# Patient Record
Sex: FEMALE | Race: BLACK OR AFRICAN AMERICAN | Employment: FULL TIME | URBAN - METROPOLITAN AREA
[De-identification: names, ages, dates, MRNs, and addresses within clinical notes are randomized per-mention and may not be internally consistent; named-entity substitution may affect disease eponyms.]

---

## 2017-10-05 ENCOUNTER — OFFICE VISIT (OUTPATIENT)
Dept: FAMILY MEDICINE CLINIC | Age: 37
End: 2017-10-05

## 2017-10-05 DIAGNOSIS — E66.9 OBESITY, UNSPECIFIED CLASSIFICATION, UNSPECIFIED OBESITY TYPE, UNSPECIFIED WHETHER SERIOUS COMORBIDITY PRESENT: Primary | ICD-10-CM

## 2017-10-05 NOTE — PROGRESS NOTES
Patient attended a Medically Supervised Weight Loss New Patient Orientation today where we discussed:  - New Direction Very Low Calorie Diet details  - Medical Supervision  - Nutrition education  - Cost  - Policies and compliance required for program enrollment. - Lab slip was given to patient with instructions for having them drawn. Patients initial consultation with physician is tentatively scheduled for:  Future Appointments  Date Time Provider Vadim Burnett   10/24/2017 11:15 AM Theoodra Saabsai, DO [facility] starting weight was documented as: There were no vitals taken for this visit. The following patient answers were pulled from Weight Loss Questionnaire regarding weight related illness: (refer to media section for full weight loss history)  Hypertension (high blood pressure)  no   High cholesterol no   Hypothyroidism no   Obstructive sleep apnea no   Gout no   Arthritis no ;    Heart Attack in the last 3 months: no     Type I Diabetes no   Type II Diabetes   no           [facility]  Metabolic   [address] [place] [address]  Medically Supervised [facility] Loss Program  [address] [place] [address]. [facility] Shana Cooper, [facility] [address]  [phone]  office  [phone]  Fax  [email or web address]  [email or web address]. [facility]. [facility]

## 2017-10-24 ENCOUNTER — OFFICE VISIT (OUTPATIENT)
Dept: FAMILY MEDICINE CLINIC | Age: 37
End: 2017-10-24

## 2017-10-24 VITALS
RESPIRATION RATE: 17 BRPM | DIASTOLIC BLOOD PRESSURE: 83 MMHG | HEIGHT: 64 IN | SYSTOLIC BLOOD PRESSURE: 128 MMHG | TEMPERATURE: 97.7 F | BODY MASS INDEX: 31.94 KG/M2 | WEIGHT: 187.1 LBS | HEART RATE: 93 BPM

## 2017-10-24 DIAGNOSIS — R73.03 PREDIABETES: ICD-10-CM

## 2017-10-24 DIAGNOSIS — E55.9 VITAMIN D DEFICIENCY: ICD-10-CM

## 2017-10-24 DIAGNOSIS — E78.5 ELEVATED LIPIDS: ICD-10-CM

## 2017-10-24 DIAGNOSIS — E66.9 OBESITY (BMI 30-39.9): Primary | ICD-10-CM

## 2017-10-24 NOTE — MR AVS SNAPSHOT
Visit Information Date & Time Provider Department Dept. Phone Encounter #  
 10/24/2017 11:15 AM Katharina Guadarrama, [address] [facility] ASSOCIATES [phone] [id number] Follow-up Instructions Return in about 6 weeks (around 12/5/2017) for MSWL & Lab Draw. Upcoming Health Maintenance Date Due DTaP/Tdap/Td series (1 - Tdap) 10/22/2003 INFLUENZA AGE 9 TO ADULT 8/1/2017 PAP AKA CERVICAL CYTOLOGY 3/9/2018 Allergies as of 10/24/2017  Review Complete On: 10/24/2017 By: Katharina Guadarrama, DO No Known Allergies Current Immunizations  Reviewed on 10/24/2017 Name Date Influenza Vaccine 12/17/2014  1:00 PM  
 Influenza Vaccine (Quad) 9/14/2015  9:50 AM  
  
 Reviewed by Maggie Barlow LPN on 70/29/9124 at 11:38 AM  
You Were Diagnosed With   
  
 Codes Comments Obesity (BMI 30-39.9)    -  Primary ICD-10-CM: S53.8 ICD-9-CM: 278.00 Vitals BP Pulse Temp Resp Height(growth percentile) Weight(growth percentile) 128/83 93 97.7 °F (36.5 °C) (Oral) 17 5' 4\" (1.626 m) 187 lb 1.6 oz (84.9 kg) LMP Breastfeeding? BMI OB Status Smoking Status 10/22/2017 Yes 32.12 kg/m2 Recent pregnancy Never Smoker Vitals History BMI and BSA Data Body Mass Index Body Surface Area  
 32.12 kg/m 2 1.96 m 2 Preferred Pharmacy Pharmacy Name Phone Mary [address] [place] [address] [place] Anand Carreno Your Updated Medication List  
  
   
This list is accurate as of: 10/24/17 12:07 PM.  Always use your most recent med list.  
  
  
  
  
 Chantelle Brenners 68 mg Impl Generic drug:  etonogestrel  
by SubDERmal route. We Performed the Following AMB POC EKG ROUTINE W/ 12 LEADS, INTER & REP [32909 CPT(R)] Follow-up Instructions Return in about 6 weeks (around 12/5/2017) for MSWL & Lab Draw. Patient Instructions Homework for [employer] 
 
 
 
Exercise Exercise daily  
- Start slow, gradually increase your time by around 10 to 20 % a week - To prevent injury, take a recovery week every 4 weeks (reduce your exercise time and intensity by 1/2 during this time) - Your goal is to work up to 150 min a week; hard enough that you can't whistle or sing. It may take 6 months to work up to this. - Call the Health  at [facility] labs for exercise ideas ([phone]) Common Side Effects (In order of how common) -Fatigue 
-Hunger 
-Constipation 
-Low sodium 
-Hair Loss 1. Fatigue Everyone goes through this stage It's normal 
It lasts 10 - 14 days It goes away It's your body adjusting to the Ketogenic diet and it's normal  
 
 
2. Hunger More common in the first few days After your body adjusts to the Ketogenic diet it will go away 3. Constipation  
-Drink at least 64 oz of non-calorie fluid a day 
-Add fiber (at least 20 grams a day) 1. Get the New Direction products with fiber added 2. Use SUGAR-FREE products: Fiber One products, Benefiber or Metamucil If you're prone to constipation: Take a Stool Softener every day. 
   (eg - Dulcolax Stool Softener 100 mg or Miralax) If you get constipated: 1. Start with a Stool Softener (produces a bowel movement in 72 hr): 
 Examples: 
    Miralax 1 capful twice a day (It's OK to go up to 3 caps for a few days) Dulcolax Stool Softener 100 mg 2. Next: If you still have constipation after 2 or 3 days, add a Stimulant Laxative (this will produce a bowel movement in 6 - 12 hr): 
 Examples: 
    Exlax (1 small square) for up to 4 days Dulcolax stimulant laxative (8.25 mg) Magnesium citrate pill 500 mg 3 - 4 pills a day 3. Or you can go straight to a combination Stool Softner / Stimulant at night. If  you need, you can add a morning dose. Examples: 
    Pericolace 50 mg / 8.25 mg     Sennakot-S  50 mg / 8.25 mg 
 
 4.  Finally If You're Really locked up, get Liquid Magnesium Citrate (take  according to the directions) 4. Low blood levels of sodium 
-Not very common, especially if you're not taking a diuretic (fluid pill) If you develop a headache, feel fatigued, light-headed or dizzy Drink 1/2 cup of tian broth up to twice a day until you feel normal 
 
 
5. Hair loss 
-This is fairly uncommon; you may see more than a usual amount of hair in your brush or the shower drain This can happen with any physical stress (surgery, trauma or calorie restriction) or psychological stress. Although is it very concerning, it is often temporary and will resolve within 3 months. Some people notice a benefit from taking a daily dose of Biotin 2,500 mcg and increasing their Fish Oil intake. Books to [address] [place] [address] 1. Change Anything: [employer] of Personal Success 
by Bruce Juarez, Michelle Salguero, and Ketan Paris 2. Mindless Eating 
by Anne Campuzano 3. Perfectly Yourself 
by Slime Roa 4. Me, Myself and I - 28 Days to Creative Self-Love 
by Stalin Do version only Note: Reading these books is a small but significant investment in yourself. Merely following the diet will reliably result in weight loss. However, revising how you respond to stressful situations, how you relate to food and, your commitment to self-care (adequate sleep, exercise & daily reflection) is the ONLY way to protect your weight loss and free yourself from your patterns that lead to weight gain. Compliance We do not expect perfection. However, we do ask for your persistence and your willingness to do the work of growing yourself. You will hit plateaus in your weight loss. You will run into situations that test your will power. Faustine Levels will encounter times when you feel frustrated. It's OK if you slip up from time to time.   However, how your respond to your slip ups and, the adjustments you make to prevent future slip ups will determine you long-term success. If you find yourself temporarily slipping in the program, it's OK. We will gently nudge you forward and encourage you to do the work of identifying and moving beyond your stuck areas. However, if you find yourself wavering in the program for a prolonged time (more than 3 or 4 months) we will ask that you take a break from the program.  At that time you will 3 options:  
 
1. Work with our weight loss specialist RAJI Kessler to explore additional weight loss options. 2. Work with a counselor to do some focused work in order to identify and break the patterns that are holding you back. 3.  The combination of both these. Once you, your counselor and/or Marlene feel that you have moved past those patterns and want to give the program another chance, we will gladly work with you to determine if you're ready to start back in the program. 
 
When returning after a break, if it's been less than 3 months, you do not need to repeat an orientation, labs or an EKG. If it's over been 3 months you will required to repeat an orientation and, if greater than 6 months, you will be required to repeat an orientation, labs and an EKG. Additional Tips 
 
- Consume your 4 daily meal replacements equally spaced over the    day No more than 6 hours between each meal 
 Breakfast is especially important - Get the support of family and friends 
 
- Snack-proof your house - Have strategies for social situations, meetings that run over or vacation - When you fall off the plan it's no big deal; just start right back. Turn those days into examples of what to change or avoid next time. Long-term Healthy Weight / Body Fat Different ways to determining your ideal weight: 
1. Keep your waist to less than 1/2 of your height 2. Keep your % body fat to under 30% for female and under 20% if male 3. Keep your BMI around 25 Supplements 1. Vitacost Synergy Basic Multi-Vitamin (Their In-House Brand) Take 1 capsule twice a day Found ONLY at [facility], NOT at [facility], [facility], the Vitamin Shoppe, etc 
    SKU #: [id number]  
    $16.49   / 1 month supply 
    www. [employer]  [phone]  
 
 
2. N-Acetyl Cysteine (NAC) -- 600 mg 
     1 pill once a day Found at many stores but [address] has a good price: 
     Item #: [id number] [id number]  $9.99 / 120 pills (4 month supply) 
     www. [employer]  [phone] 3. Turmeric with Black Pepper Extract (or Bioperine) 500 mg 
    1 pill 2 - 3 times a day (more is joint pain or increased inflammation) Found at many stores but [address] has a good price: 
    SKU #: [id number]  $13.64 / 61 pills 
    www. [employer]  [phone] 4. Probiotic: 15-35 (35 billion CFU) 1 capsule twice a day for 2 weeks, then 3 days a week SKU #: [id number]  $27.99 / 120 capsules 
     www. [employer]  [phone] 5. Fish Oil Take 1 capsule daily FYI:  
Typical fish oil per pill =  mg and  mg 
Krill oil per pill = EPA 50 - 70 mg and DHA 27 - 250 mg 
 
6. Other ones to help with will power -Dona [employer] Crab Apple - Helps you with self acceptance Agrimony - Helps you be authentic and not fall prey to other's expectations Gorse - Helps you stay strong when you feel like giving up Put 4 drops in 10 oz water or a meal replacement 2 - 4 times a day Around $15 a vial which lasts ~3 months 
       www. [employer]  [phone] 
 
 
    -Etherium Red Decision Power 1oz spray 16.95 Gives you more will power in the face of tough decisions 
        www.[employer]. [employer] or [phone] Drink Filtered Water My favorite water filter (also alkalinizes your water - much better than Mitzi) pH REFRESH Alkaline Water Pitcher Ionizer With 2 Long-Life Filters - Alkaline  Machine - Ionized Water Alkalizer Filter, 84oz, 2.5L (2017 Model) Mota [place]) Sold on [employer] w/ Free Shipping 
 
 
 
^^^^^^^^^^^^^^^^^^^^^^^^^^^^^^^^^^^^^^^^^^^^^^^^^^^^^^^^^^^^^^^^^^^^^^^^^^^^^^^^^^^^^^^^^^^^^^^ Carbohydrates If you do not metabolize carbohydrates well, you will lose weight and keep the weigh off by keeping your carbohydrate intake low. How do you know if you do not metabolize carbs well? If your Triglycerides, sdLCL-C and Insulin Resistance are elevated, then you will do well to follow a low carb diet. Fun Facts About Carbs - The Typical American Diet = 450 grams a day - The Reducing Phase of the VLCD = 40 grams a day - Target for weight loss maintenance: 
 - Eat no more than 30 grams per meal 
 - Eat no more than 10 grams per snack (mid-morning and mid-afternoon snack) Resources for finding out where carbs hide in our foods: 
1. Our Registered Dietitians 2. Www. [employer]. com/tools 3. Read food labels 4. Books - The Calorie Jihan Gomez - The New Atkins Diet for a New You 
     - Tameka Car's NEW Carb and Calorie 4th Edition (my favorite) 5. Smart phone and Internet-based apps - [employer]Pal  
     - Carb Manager If you want to get a better picture of your cardiac risk, consider getting a coronary calcium score:  Call [phone] to schedule one. Introducing [facility]! [employer] introduces Cost Effective Data patient portal. Now you can access parts of your medical record, email your doctor's office, and request medication refills online. 1. In your internet browser, go to [email or web address] 2. Click on the First Time User? Click Here link in the Sign In box. You will see the New Member Sign Up page. 3. Enter your Omicia Access Code exactly as it appears below. You will not need to use this code after youve completed the sign-up process. If you do not sign up before the expiration date, you must request a new code. · Omicia Access Code: [email or web address] Expires: 1/22/2018 12:07 PM 
 
4. Enter the last four digits of your Social Security Number (xxxx) and Date of Birth (mm/dd/yyyy) as indicated and click Submit. You will be taken to the next sign-up page. 5. Create a Omicia ID. This will be your Omicia login ID and cannot be changed, so think of one that is secure and easy to remember. 6. Create a Omicia password. You can change your password at any time. 7. Enter your Password Reset Question and Answer. This can be used at a later time if you forget your password. 8. Enter your e-mail address. You will receive e-mail notification when new information is available in [address]. 9. Click Sign Up. You can now view and download portions of your medical record. 10. Click the Download Summary menu link to download a portable copy of your medical information. If you have questions, please visit the Frequently Asked Questions section of the Omicia website. Remember, Omicia is NOT to be used for urgent needs. For medical emergencies, dial 911. Now available from your iPhone and Android! Please provide this summary of care documentation to your next provider. Your primary care clinician is listed as LINDSEY Tuttle. If you have any questions after today's visit, please call [phone].

## 2017-10-24 NOTE — PATIENT INSTRUCTIONS
Homework for [employer]        Exercise    Exercise daily   - Start slow, gradually increase your time by around 10 to 20 % a week    - To prevent injury, take a recovery week every 4 weeks (reduce your exercise time and intensity by 1/2 during this time)    - Your goal is to work up to 150 min a week; hard enough that you can't whistle or sing. It may take 6 months to work up to this. - Call the Health  at [facility] labs for exercise ideas ([phone])          Common Side Effects   (In order of how common)    -Fatigue  -Hunger  -Constipation  -Low sodium  -Hair Loss      1. Fatigue  Everyone goes through this stage  It's normal  It lasts 10 - 14 days  It goes away  It's your body adjusting to the Ketogenic diet and it's normal       2. Hunger  More common in the first few days  After your body adjusts to the Ketogenic diet it will go away       3. Constipation   -Drink at least 64 oz of non-calorie fluid a day  -Add fiber (at least 20 grams a day)     1. Get the New Direction products with fiber added     2. Use SUGAR-FREE products: Fiber One products, Benefiber or Metamucil    If you're prone to constipation: Take a Stool Softener every day.     (eg - Dulcolax Stool Softener 100 mg or Miralax)    If you get constipated:     1. Start with a Stool Softener (produces a bowel movement in 72 hr):   Examples:      Miralax 1 capful twice a day (It's OK to go up to 3 caps for a few days)      Dulcolax Stool Softener 100 mg       2. Next: If you still have constipation after 2 or 3 days, add a Stimulant          Laxative (this will produce a bowel movement in 6 - 12 hr):   Examples:      Exlax (1 small square) for up to 4 days      Dulcolax stimulant laxative (8.25 mg)       Magnesium citrate pill 500 mg 3 - 4 pills a day       3. Or you can go straight to a combination Stool Softner / Stimulant at night. If  you need, you can add a morning dose.    Examples:      Pericolace 50 mg / 8.25 mg      Sennakot-S  50 mg / 8.25 mg       4. Finally If You're Really locked up, get Liquid Magnesium Citrate (take  according to the directions)      4. Low blood levels of sodium  -Not very common, especially if you're not taking a diuretic (fluid pill)  If you develop a headache, feel fatigued, light-headed or dizzy    Drink 1/2 cup of bouillon broth up to twice a day until you feel normal      5. Hair loss  -This is fairly uncommon; you may see more than a usual amount of hair in your brush or the shower drain  This can happen with any physical stress (surgery, trauma or calorie restriction) or psychological stress. Although is it very concerning, it is often temporary and will resolve within 3 months. Some people notice a benefit from taking a daily dose of Biotin 2,500 mcg and increasing their Fish Oil intake. Books to [address] [place] [address]    1. Change Anything: [employer] of Personal Success  by Chemo Chávez, Ochoa Coker, and Fredrick Farah    2. Mindless Eating  by Carolina Perez    3. Perfectly Yourself  by Katalina King    4. Me, Myself and I - 28 Days to Creative Self-Love  by Mag Hernandez version only    Note: Reading these books is a small but significant investment in yourself. Merely following the diet will reliably result in weight loss. However, revising how you respond to stressful situations, how you relate to food and, your commitment to self-care (adequate sleep, exercise & daily reflection) is the ONLY way to protect your weight loss and free yourself from your patterns that lead to weight gain. Compliance    We do not expect perfection. However, we do ask for your persistence and your willingness to do the work of growing yourself. You will hit plateaus in your weight loss. You will run into situations that test your will power. Raudel Cantu will encounter times when you feel frustrated. It's OK if you slip up from time to time.   However, how your respond to your slip ups and, the adjustments you make to prevent future slip ups will determine you long-term success. If you find yourself temporarily slipping in the program, it's OK. We will gently nudge you forward and encourage you to do the work of identifying and moving beyond your stuck areas. However, if you find yourself wavering in the program for a prolonged time (more than 3 or 4 months) we will ask that you take a break from the program.  At that time you will 3 options:     1. Work with our weight loss specialist RAIJ Kessler to explore additional weight loss options. 2. Work with a counselor to do some focused work in order to identify and break the patterns that are holding you back. 3.  The combination of both these. Once you, your counselor and/or Marlene feel that you have moved past those patterns and want to give the program another chance, we will gladly work with you to determine if you're ready to start back in the program.    When returning after a break, if it's been less than 3 months, you do not need to repeat an orientation, labs or an EKG. If it's over been 3 months you will required to repeat an orientation and, if greater than 6 months, you will be required to repeat an orientation, labs and an EKG. Additional Tips    - Consume your 4 daily meal replacements equally spaced over the    day   No more than 6 hours between each meal   Breakfast is especially important    - Get the support of family and friends    - Snack-proof your house    - Have strategies for social situations, meetings that run over or vacation      - When you fall off the plan it's no big deal; just start right back. Turn those days into examples of what to change or avoid next time. Long-term Healthy Weight / Body Fat  Different ways to determining your ideal weight:  1. Keep your waist to less than 1/2 of your height   2.  Keep your % body fat to under 30% for female and under 20% if male  3. Keep your BMI around 25          Supplements      1. Vitacost Synergy Basic Multi-Vitamin (Their In-House Brand)      Take 1 capsule twice a day        Found ONLY at [facility], NOT at [facility], [place], [facility], the Vitamin Shoppe, etc      SKU #: [id number]       $16.49   / 1 month supply      www. [employer]  [phone]       2. N-Acetyl Cysteine (NAC) -- 600 mg       1 pill once a day       Found at many stores but [employer] [place] has a good price:       Item #: [id number]  $9.99 / 120 pills (4 month supply)       www. [employer]  [phone]      3. Turmeric with Black Pepper Extract (or Bioperine) 500 mg      1 pill 2 - 3 times a day (more is joint pain or increased inflammation)      Found at many stores but [employer] has a good price:      SKU #: [id number]  $13.64 / 60 pills      www. [employer]  [phone]       4. Probiotic: 15-35 (35 billion CFU)         1 capsule twice a day for 2 weeks, then 3 days a week       SKU #: [id number]  $27.99 / 120 capsules       www. [employer]  [phone]       5. Fish Oil      Take 1 capsule daily                             FYI:   Typical fish oil per pill =  mg and  mg  Krill oil per pill = EPA 50 - 70 mg and DHA 27 - 250 mg    6. Other ones to help with will power      -[address] Edie you with self acceptance         Agrimony - Helps you be authentic and not fall prey to other's expectations         Gorse - Helps you stay strong when you feel like giving up           Put 4 drops in 10 oz water or a meal replacement 2 - 4 times a day         Around $15 a vial which lasts ~3 months         www. [employer]  [phone]          -Etherium Red Decision Power 1oz spray 16.95          Gives you more will power in the face of tough decisions          www.[employer]. com or (50 [phone]                Drink Filtered Water    My favorite water filter (also alkalinizes your water - much better than Mitzi)    pH REFRESH Alkaline Water Pitcher Ionizer With 2 Long-Life Filters - Alkaline  Machine - Ionized Water Alkalizer Filter, 84oz, 2.5L (2017 Model) (White)   Sold on [employer] w/ Free Shipping        ^^^^^^^^^^^^^^^^^^^^^^^^^^^^^^^^^^^^^^^^^^^^^^^^^^^^^^^^^^^^^^^^^^^^^^^^^^^^^^^^^^^^^^^^^^^^^^^      Carbohydrates     If you do not metabolize carbohydrates well, you will lose weight and keep the weigh off by keeping your carbohydrate intake low. How do you know if you do not metabolize carbs well? If your Triglycerides, sdLCL-C and Insulin Resistance are elevated, then you will do well to follow a low carb diet. Fun Facts About Carbs    - The Typical American Diet = 450 grams a day    - The Reducing Phase of the VLCD = 40 grams a day    - Target for weight loss maintenance:   - Eat no more than 30 grams per meal   - Eat no more than 10 grams per snack (mid-morning and mid-afternoon snack)        Resources for finding out where carbs hide in our foods:  1. Our Registered Dietitians    2. Www. Atkins. com/tools    3. Read food labels    4. Books       - The Calorie Nikky Mich       - The [place] System Diet for a New You       - Tameka Car's NEW Carb and Calorie 4th Edition (my favorite)    5. Smart phone and Internet-based apps       - [employer] FunPal        - Carb Manager      If you want to get a better picture of your cardiac risk, consider getting a coronary calcium score:  Call [phone] to schedule one.

## 2017-10-24 NOTE — PROGRESS NOTES
[facility] Progress Note: Initial Physician Visit     Shira Vieira is a 30 y.o. female who is here for medical screening for entering the [facility].     CC:  Obesity      Weight History  I personally reviewed the Medical Screening Roel Edgar completed by patient and scanned into media section of chart. It includes duration of their obesity, maximum weight, goal weight and weight gain time line (timing), all of which give the context of their obesity AND a Family History of their obesity. Is their Weight Loss Goal entered in to Comments? Yes 135lb    Weight loss History  I personally reviewed the Medical Screening Roel Edgar completed by the patient and scanned into media section of chart. It includes number of weight loss attempts, the weight loss program that patients was most successful using, and if they have any hx of anorectic medication use, including OTC supplements. This captures modifying factors. Significant Medical History  History reviewed. No pertinent past medical history. I personally reviewed the Medical Screening Roel Edgar completed by the patient and scanned into media section of chart. This allows me to assess associated symptoms that are significant in the assessment of the patient's obesity and the patient's Past Medical History. Outpatient Prescriptions Marked as Taking for the 10/24/17 encounter (Office Visit) with Ana Maria Schuler, DO   Medication Sig Dispense Refill    etonogestrel (NEXPLANON) 68 mg impl by SubDERmal route.            Significant Psychosocial History   Has a doctor every diagnosed with Binge Eating Disorder, Bulemia or Anorexia? : no     Compliance  Upcoming Travel? yes    Social History  Social History   Substance Use Topics    Smoking status: Never Smoker    Smokeless tobacco: Never Used    Alcohol use No       Exercise  I personally reviewed the Medical Screening Roel Edgar completed by the patient and scanned into media section of chart. Review of Systems  See HPI        Objective  Visit Vitals    /83    Pulse 93    Temp 97.7 °F (36.5 °C) (Oral)    Resp 17    Ht 5' 4\" (1.626 m)    Wt 187 lb 1.6 oz (84.9 kg)    LMP 10/22/2017    Breastfeeding Yes    BMI 32.12 kg/m2         Weight Metrics 10/24/2017 10/24/2017 6/24/2016 2/26/2016 9/14/2015 6/3/2015 5/22/2015   Weight - 187 lb 1.6 oz 191 lb 6.4 oz 180 lb 177 lb 174 lb 177 lb 14.4 oz   Waist Measure Inches 31 - - - - - -   Body Fat % 32 - - - - - -   BMI - 32.12 kg/m2 32.84 kg/m2 30.88 kg/m2 30.37 kg/m2 29.85 kg/m2 30.52 kg/m2       Labs: See HDL labs scanned into Media section       Physical Exam    Vital Signs Reviewed  Weight Management Metrics Reviewed    Appearance: Obese  HEENT:  Scleral icterus?  no  Neck:  Thyromegaly or nodules? no  Mouth: Large tongue no  Heart:  RRR  Lungs:  LCTA-B  Abdomen:     Hepatomegaly? no   Striae present? no  Skin:    Acne?  no   Hirsutism? no   Skin tags? no   Acanthosis Nigricans?  no  Ext:  Edema? no      Assessment & Plan  Encounter Diagnoses   Name Primary?  Obesity (BMI 30-39. 9) Yes    Vitamin D deficiency     Prediabetes     Elevated lipids        1. Labs reviewed w/ patient    2. EKG reviewed w/ patient:   Personally reviewed by me, NSR, No abnormalities,    QTc = 398 sec (Upper limit is 440 for males & 460 for females)    3. Medication changes include: None    4. Based on his history, labs and EKG, Angela Quinones is now enrolled for the [facility].     5.  Individualized Patient Plan is as follows:   Diet Regimen:  Meal Replacements daily. Weigh in: weekly at [facility]   BP f/up plan: weekly at [facility]       25 min of the 45 minutes face to face time with Desi consisted of counseling & coordinating and/or discussing treatment plans in reference to her above mentioned diagnoses.

## 2017-10-31 ENCOUNTER — CLINICAL SUPPORT (OUTPATIENT)
Dept: FAMILY MEDICINE CLINIC | Age: 37
End: 2017-10-31

## 2017-10-31 VITALS
HEIGHT: 64 IN | TEMPERATURE: 97.6 F | WEIGHT: 183.8 LBS | SYSTOLIC BLOOD PRESSURE: 110 MMHG | RESPIRATION RATE: 18 BRPM | BODY MASS INDEX: 31.38 KG/M2 | HEART RATE: 84 BPM | DIASTOLIC BLOOD PRESSURE: 65 MMHG

## 2017-10-31 DIAGNOSIS — E66.9 OBESITY (BMI 30.0-34.9): Primary | ICD-10-CM

## 2017-10-31 NOTE — PROGRESS NOTES
Progress Note: Weekly Education Class in the [facility]   Is there anything that you or the patient needs to let Dr Annita Larry know about? no  Over the past week, have you experienced any side-effects? Dry mouth    Kate Orantes is a 30 y.o. female who is enrolled in [facility]    Visit Vitals    Ht 5' 4\" (1.626 m)    LMP 10/22/2017     Weight Metrics 10/24/2017 10/24/2017 6/24/2016 2/26/2016 9/14/2015 6/3/2015 5/22/2015   Weight - 187 lb 1.6 oz 191 lb 6.4 oz 180 lb 177 lb 174 lb 177 lb 14.4 oz   Waist Measure Inches 31 - - - - - -   Body Fat % 32 - - - - - -   BMI - 32.12 kg/m2 32.84 kg/m2 30.88 kg/m2 30.37 kg/m2 29.85 kg/m2 30.52 kg/m2         Have you received any other medical care this week? no  If yes, where and for what? Have you had any change in your medications since your last visit? no  If yes what? Did you have any problems adhering to the program last week? no  If yes, please explain:       Eating Habits Over Last Week:  Did you take in 64 oz of non-caloric fluids? yes     Did you consume your 4 meal replacements each day?  yes       Physical Activity Over the Past Week:    Aerobic exercise: 90 min  Resistance exercise: 0 workouts / week

## 2017-11-07 ENCOUNTER — CLINICAL SUPPORT (OUTPATIENT)
Dept: FAMILY MEDICINE CLINIC | Age: 37
End: 2017-11-07

## 2017-11-07 DIAGNOSIS — E66.9 OBESITY (BMI 30.0-34.9): Primary | ICD-10-CM

## 2017-11-08 VITALS
DIASTOLIC BLOOD PRESSURE: 81 MMHG | BODY MASS INDEX: 30.48 KG/M2 | WEIGHT: 178.5 LBS | SYSTOLIC BLOOD PRESSURE: 125 MMHG | HEART RATE: 93 BPM | HEIGHT: 64 IN

## 2017-11-08 NOTE — PROGRESS NOTES
Progress Note: Weekly Education Class in the [facility]   Is there anything that you or the patient needs to let Dr Meeta Joiner know about? no  Over the past week, have you experienced any side-effects? no    Evelyne Day is a 30 y.o. female who is enrolled in [facility]    Visit Vitals    /81    Pulse 93    Ht 5' 4\" (1.626 m)    Wt 178 lb 8 oz (81 kg)    LMP 10/22/2017    BMI 30.64 kg/m2     Weight Metrics 11/8/2017 11/7/2017 10/31/2017 10/31/2017 10/24/2017 10/24/2017 6/24/2016   Weight - 178 lb 8 oz - 183 lb 12.8 oz - 187 lb 1.6 oz 191 lb 6.4 oz   Waist Measure Inches 31 - - - 31 - -   Body Fat % 30.5 - 35.6 - 32 - -   BMI - 30.64 kg/m2 - 31.55 kg/m2 - 32.12 kg/m2 32.84 kg/m2         Have you received any other medical care this week? no  If yes, where and for what? Have you had any change in your medications since your last visit? no  If yes what? Did you have any problems adhering to the program last week? no  If yes, please explain:       Eating Habits Over Last Week:  Did you take in 64 oz of non-caloric fluids? yes     Did you consume your 4 meal replacements each day?  yes       Physical Activity Over the Past Week:    Aerobic exercise: 90 min  Resistance exercise: 0 workouts / week

## 2017-11-14 ENCOUNTER — CLINICAL SUPPORT (OUTPATIENT)
Dept: FAMILY MEDICINE CLINIC | Age: 37
End: 2017-11-14

## 2017-11-14 VITALS
DIASTOLIC BLOOD PRESSURE: 79 MMHG | HEART RATE: 91 BPM | WEIGHT: 175.5 LBS | SYSTOLIC BLOOD PRESSURE: 116 MMHG | HEIGHT: 64 IN | BODY MASS INDEX: 29.96 KG/M2

## 2017-11-14 DIAGNOSIS — E66.9 OBESITY (BMI 30.0-34.9): Primary | ICD-10-CM

## 2017-11-14 NOTE — PROGRESS NOTES
Progress Note: Weekly Education Class in the [facility]   Is there anything that you or the patient needs to let Dr Livier Koch know about? no  Over the past week, have you experienced any side-effects? no    Jony Luna is a 30 y.o. female who is enrolled in [facility]    Visit Vitals    /79    Pulse 91    Ht 5' 4\" (1.626 m)    Wt 175 lb 8 oz (79.6 kg)    LMP 10/22/2017    BMI 30.12 kg/m2     Weight Metrics 11/14/2017 11/8/2017 11/7/2017 10/31/2017 10/31/2017 10/24/2017 10/24/2017   Weight 175 lb 8 oz - 178 lb 8 oz - 183 lb 12.8 oz - 187 lb 1.6 oz   Waist Measure Inches 30 31 - - - 31 -   Body Fat % 29 30.5 - 35.6 - 32 -   BMI 30.12 kg/m2 - 30.64 kg/m2 - 31.55 kg/m2 - 32.12 kg/m2         Have you received any other medical care this week? no  If yes, where and for what? Have you had any change in your medications since your last visit? no  If yes what? Did you have any problems adhering to the program last week? no  If yes, please explain:       Eating Habits Over Last Week:  Did you take in 64 oz of non-caloric fluids? yes     Did you consume your 4 meal replacements each day?  yes       Physical Activity Over the Past Week:    Aerobic exercise: 90 min  Resistance exercise: 0 workouts / week

## 2017-11-21 ENCOUNTER — CLINICAL SUPPORT (OUTPATIENT)
Dept: FAMILY MEDICINE CLINIC | Age: 37
End: 2017-11-21

## 2017-11-21 VITALS
BODY MASS INDEX: 29.67 KG/M2 | WEIGHT: 173.8 LBS | DIASTOLIC BLOOD PRESSURE: 78 MMHG | HEIGHT: 64 IN | SYSTOLIC BLOOD PRESSURE: 121 MMHG | HEART RATE: 63 BPM

## 2017-11-21 DIAGNOSIS — E66.9 OBESITY (BMI 30.0-34.9): Primary | ICD-10-CM

## 2017-11-21 NOTE — PROGRESS NOTES
Progress Note: Weekly Education Class in the [facility]   Is there anything that you or the patient needs to let Dr Vearl Boxer know about? yes  Over the past week, have you experienced any side-effects? no    Teresa Snowden is a 30 y.o. female who is enrolled in [facility]    Visit Vitals    /78    Pulse 63    Ht 5' 4\" (1.626 m)    Wt 173 lb 12.8 oz (78.8 kg)    LMP 10/22/2017    BMI 29.83 kg/m2     Weight Metrics 11/21/2017 11/14/2017 11/8/2017 11/7/2017 10/31/2017 10/31/2017 10/24/2017   Weight 173 lb 12.8 oz 175 lb 8 oz - 178 lb 8 oz - 183 lb 12.8 oz -   Waist Measure Inches 30 30 31 - - - 31   Body Fat % 28 29 30.5 - 35.6 - 32   BMI 29.83 kg/m2 30.12 kg/m2 - 30.64 kg/m2 - 31.55 kg/m2 -         Have you received any other medical care this week? no  If yes, where and for what? Have you had any change in your medications since your last visit? no  If yes what? Did you have any problems adhering to the program last week? no  If yes, please explain:       Eating Habits Over Last Week:  Did you take in 64 oz of non-caloric fluids? no     Did you consume your 4 meal replacements each day?  yes       Physical Activity Over the Past Week:    Aerobic exercise: 90 min  Resistance exercise: 0 workouts / week

## 2017-11-28 ENCOUNTER — CLINICAL SUPPORT (OUTPATIENT)
Dept: FAMILY MEDICINE CLINIC | Age: 37
End: 2017-11-28

## 2017-11-28 VITALS
DIASTOLIC BLOOD PRESSURE: 82 MMHG | SYSTOLIC BLOOD PRESSURE: 112 MMHG | HEIGHT: 64 IN | WEIGHT: 170.6 LBS | BODY MASS INDEX: 29.12 KG/M2 | HEART RATE: 67 BPM

## 2017-11-28 DIAGNOSIS — E66.9 OBESITY (BMI 30.0-34.9): Primary | ICD-10-CM

## 2017-11-28 NOTE — PROGRESS NOTES
Progress Note: Weekly Education Class in the [facility] Program   Is there anything that you or the patient needs to let Dr Carla Amaral know about? no  Over the past week, have you experienced any side-effects? no    Paty Quispe is a 30 y.o. female who is enrolled in [facility]    Visit Vitals    /82    Pulse 67    Ht 5' 4\" (1.626 m)    Wt 170 lb 9.6 oz (77.4 kg)    BMI 29.28 kg/m2     Weight Metrics 11/28/2017 11/21/2017 11/14/2017 11/8/2017 11/7/2017 10/31/2017 10/31/2017   Weight 170 lb 9.6 oz 173 lb 12.8 oz 175 lb 8 oz - 178 lb 8 oz - 183 lb 12.8 oz   Waist Measure Inches 29 30 30 31 - - -   Body Fat % 29 28 29 30.5 - 35.6 -   BMI 29.28 kg/m2 29.83 kg/m2 30.12 kg/m2 - 30.64 kg/m2 - 31.55 kg/m2         Have you received any other medical care this week? no  If yes, where and for what? Have you had any change in your medications since your last visit? no  If yes what? Did you have any problems adhering to the program last week? no  If yes, please explain:       Eating Habits Over Last Week:  Did you take in 64 oz of non-caloric fluids? yes     Did you consume your 4 meal replacements each day?  yes       Physical Activity Over the Past Week:    Aerobic exercise: 90 min  Resistance exercise: 0 workouts / week

## 2017-12-05 ENCOUNTER — OFFICE VISIT (OUTPATIENT)
Dept: FAMILY MEDICINE CLINIC | Age: 37
End: 2017-12-05

## 2017-12-05 VITALS
SYSTOLIC BLOOD PRESSURE: 125 MMHG | HEIGHT: 64 IN | HEART RATE: 71 BPM | RESPIRATION RATE: 18 BRPM | BODY MASS INDEX: 28.17 KG/M2 | DIASTOLIC BLOOD PRESSURE: 78 MMHG | TEMPERATURE: 97.6 F | WEIGHT: 165 LBS

## 2017-12-05 DIAGNOSIS — E66.3 OVERWEIGHT (BMI 25.0-29.9): ICD-10-CM

## 2017-12-05 DIAGNOSIS — Z86.39 HX OF OBESITY: ICD-10-CM

## 2017-12-05 DIAGNOSIS — R73.03 PREDIABETES: ICD-10-CM

## 2017-12-05 DIAGNOSIS — E78.5 ELEVATED LIPIDS: ICD-10-CM

## 2017-12-05 DIAGNOSIS — E55.9 VITAMIN D DEFICIENCY: ICD-10-CM

## 2017-12-05 DIAGNOSIS — E66.3 OVERWEIGHT (BMI 25.0-29.9): Primary | ICD-10-CM

## 2017-12-05 PROBLEM — E66.9 OBESITY (BMI 30-39.9): Status: RESOLVED | Noted: 2017-10-24 | Resolved: 2017-12-05

## 2017-12-05 NOTE — PATIENT INSTRUCTIONS
Body Mass Index: Care Instructions  Your Care Instructions    Body mass index (BMI) can help you see if your weight is raising your risk for health problems. It uses a formula to compare how much you weigh with how tall you are. · A BMI lower than 18.5 is considered underweight. · A BMI between 18.5 and 24.9 is considered healthy. · A BMI between 25 and 29.9 is considered overweight. A BMI of 30 or higher is considered obese. If your BMI is in the normal range, it means that you have a lower risk for weight-related health problems. If your BMI is in the overweight or obese range, you may be at increased risk for weight-related health problems, such as high blood pressure, heart disease, stroke, arthritis or joint pain, and diabetes. If your BMI is in the underweight range, you may be at increased risk for health problems such as fatigue, lower protection (immunity) against illness, muscle loss, bone loss, hair loss, and hormone problems. BMI is just one measure of your risk for weight-related health problems. You may be at higher risk for health problems if you are not active, you eat an unhealthy diet, or you drink too much alcohol or use tobacco products. Follow-up care is a key part of your treatment and safety. Be sure to make and go to all appointments, and call your doctor if you are having problems. It's also a good idea to know your test results and keep a list of the medicines you take. How can you care for yourself at home? · Practice healthy eating habits. This includes eating plenty of fruits, vegetables, whole grains, lean protein, and low-fat dairy. · If your doctor recommends it, get more exercise. Walking is a good choice. Bit by bit, increase the amount you walk every day. Try for at least 30 minutes on most days of the week. · Do not smoke. Smoking can increase your risk for health problems. If you need help quitting, talk to your doctor about stop-smoking programs and medicines. These can increase your chances of quitting for good. · Limit alcohol to 2 drinks a day for men and 1 drink a day for women. Too much alcohol can cause health problems. If you have a BMI higher than 25  · Your doctor may do other tests to check your risk for weight-related health problems. This may include measuring the distance around your waist. A waist measurement of more than 40 inches in men or 35 inches in women can increase the risk of weight-related health problems. · Talk with your doctor about steps you can take to stay healthy or improve your health. You may need to make lifestyle changes to lose weight and stay healthy, such as changing your diet and getting regular exercise. If you have a BMI lower than 18.5  · Your doctor may do other tests to check your risk for health problems. · Talk with your doctor about steps you can take to stay healthy or improve your health. You may need to make lifestyle changes to gain or maintain weight and stay healthy, such as getting more healthy foods in your diet and doing exercises to build muscle. Where can you learn more? Go to http://rosaels-landy.info/. Enter S176 in the search box to learn more about \"Body Mass Index: Care Instructions. \"  Current as of: October 13, 2016  Content Version: 11.4  © 6824-5592 [employer]. Care instructions adapted under license by [employer] (which disclaims liability or warranty for this information). If you have questions about a medical condition or this instruction, always ask your healthcare professional. [address] any warranty or liability for your use of this information.

## 2017-12-05 NOTE — MR AVS SNAPSHOT
Visit Information Date & Time Provider Department Dept. Phone Encounter #  
 12/5/2017 11:30 AM Alden IyerCaelb [id number] Follow-up Instructions Return in about 6 weeks (around 1/16/2018) for MS[facility] & Lab Draw. Your Appointments 12/12/2017  9:55 PM  
METABOLIC PROGRAM 5 with HRMP WEEKLY CLASS AMA  
HR Metabolic Program ([facility] [address]) Appt Note: wt check and bp check HR Metabolic Program ([address] [place], [place] [address]) [phone]  
  
    
 12/19/2017  5:06 PM  
METABOLIC PROGRAM 5 with HRMP WEEKLY CLASS AMA  
HR Metabolic Program ([facility] [address]) Appt Note: wt check and bp check HR Metabolic Program ([facility] [address]) [phone]  
  
    
 12/26/2017  5:14 PM  
METABOLIC PROGRAM 5 with HRMP WEEKLY CLASS AMA  
HR Metabolic Program ([facility] [address]) Appt Note: wt check and bp check HR Metabolic Program ([facility] [employer]) [phone] Upcoming Health Maintenance Date Due DTaP/Tdap/Td series (1 - Tdap) 10/22/2003 Influenza Age 5 to Adult 8/1/2017 PAP AKA CERVICAL CYTOLOGY 3/9/2018 Allergies as of 12/5/2017  Review Complete On: 12/5/2017 By: Alden Iyer DO No Known Allergies Current Immunizations  Reviewed on 12/5/2017 Name Date Influenza Vaccine 12/17/2014  1:00 PM  
 Influenza Vaccine (Quad) 9/14/2015  9:50 AM  
  
 Reviewed by Stephy Coyle LPN on 76/9/0662 at 94:11 AM  
You Were Diagnosed With   
  
 Codes Comments Overweight (BMI 25.0-29.9)    -  Primary ICD-10-CM: E77.9 ICD-9-CM: 278.02 Hx of obesity     ICD-10-CM: Z86.39 
ICD-9-CM: V13.89 Elevated lipids     ICD-10-CM: E78.5 ICD-9-CM: 272.4 Prediabetes     ICD-10-CM: R73.03 
ICD-9-CM: 790.29 Vitamin D deficiency     ICD-10-CM: E55.9 ICD-9-CM: 268.9 Vitals BP Pulse Temp Resp Height(growth percentile) Weight(growth percentile) 125/78 71 97.6 °F (36.4 °C) (Oral) 18 5' 4\" (1.626 m) 165 lb (74.8 kg) LMP BMI OB Status Smoking Status 11/30/2017 28.32 kg/m2 Having regular periods Never Smoker Vitals History BMI and BSA Data Body Mass Index Body Surface Area  
 28.32 kg/m 2 1.84 m 2 Preferred Pharmacy Pharmacy Name Phone [facility] [address] [place] [address] Tatyana Carreno Your Updated Medication List  
  
   
This list is accurate as of: 12/5/17 11:55 AM.  Always use your most recent med list.  
  
  
  
  
 Himanshu Brittle 68 mg Impl Generic drug:  etonogestrel  
by SubDERmal route. Follow-up Instructions Return in about 6 weeks (around 1/16/2018) for MS[facility] & Lab Draw. To-Do List   
 12/05/2017 Lab:  METABOLIC PANEL, COMPREHENSIVE   
  
 12/05/2017 Lab:  URIC ACID Patient Instructions Body Mass Index: Care Instructions Your Care Instructions Body mass index (BMI) can help you see if your weight is raising your risk for health problems. It uses a formula to compare how much you weigh with how tall you are. · A BMI lower than 18.5 is considered underweight. · A BMI between 18.5 and 24.9 is considered healthy. · A BMI between 25 and 29.9 is considered overweight. A BMI of 30 or higher is considered obese. If your BMI is in the normal range, it means that you have a lower risk for weight-related health problems. If your BMI is in the overweight or obese range, you may be at increased risk for weight-related health problems, such as high blood pressure, heart disease, stroke, arthritis or joint pain, and diabetes. If your BMI is in the underweight range, you may be at increased risk for health problems such as fatigue, lower protection (immunity) against illness, muscle loss, bone loss, hair loss, and hormone problems. BMI is just one measure of your risk for weight-related health problems. You may be at higher risk for health problems if you are not active, you eat an unhealthy diet, or you drink too much alcohol or use tobacco products. Follow-up care is a key part of your treatment and safety. Be sure to make and go to all appointments, and call your doctor if you are having problems. It's also a good idea to know your test results and keep a list of the medicines you take. How can you care for yourself at home? · Practice healthy eating habits. This includes eating plenty of fruits, vegetables, whole grains, lean protein, and low-fat dairy. · If your doctor recommends it, get more exercise. Walking is a good choice. Bit by bit, increase the amount you walk every day. Try for at least 30 minutes on most days of the week. · Do not smoke. Smoking can increase your risk for health problems. If you need help quitting, talk to your doctor about stop-smoking programs and medicines. These can increase your chances of quitting for good. · Limit alcohol to 2 drinks a day for men and 1 drink a day for women. Too much alcohol can cause health problems. If you have a BMI higher than 25 · Your doctor may do other tests to check your risk for weight-related health problems. This may include measuring the distance around your waist. A waist measurement of more than 40 inches in men or 35 inches in women can increase the risk of weight-related health problems. · Talk with your doctor about steps you can take to stay healthy or improve your health. You may need to make lifestyle changes to lose weight and stay healthy, such as changing your diet and getting regular exercise. If you have a BMI lower than 18.5 · Your doctor may do other tests to check your risk for health problems. · Talk with your doctor about steps you can take to stay healthy or improve your health.  You may need to make lifestyle changes to gain or maintain weight and stay healthy, such as getting more healthy foods in your diet and doing exercises to build muscle. Where can you learn more? Go to http://rosales-landy.info/. Enter S176 in the search box to learn more about \"Body Mass Index: Care Instructions. \" Current as of: October 13, 2016 Content Version: 11.4 © 2891-2864 [employer]. Care instructions adapted under license by [employer] (which disclaims liability or warranty for this information). If you have questions about a medical condition or this instruction, always ask your healthcare professional. [address] any warranty or liability for your use of this information. Introducing [facility]! Vishal Abraham introduces [employer] patient portal. Now you can access parts of your medical record, email your doctor's office, and request medication refills online. 1. In your internet browser, go to [email or web address] 2. Click on the First Time User? Click Here link in the Sign In box. You will see the New Member Sign Up page. 3. Enter your [employer] Access Code exactly as it appears below. You will not need to use this code after youve completed the sign-up process. If you do not sign up before the expiration date, you must request a new code. · [employer] Access Code: [email or web address] Expires: 1/22/2018 11:07 AM 
 
4. Enter the last four digits of your Social Security Number (xxxx) and Date of Birth (mm/dd/yyyy) as indicated and click Submit. You will be taken to the next sign-up page. 5. Create a [employer] ID. This will be your [employer] login ID and cannot be changed, so think of one that is secure and easy to remember. 6. Create a [employer] password. You can change your password at any time. 7. Enter your Password Reset Question and Answer. This can be used at a later time if you forget your password. 8. Enter your e-mail address. You will receive e-mail notification when new information is available in [address] 9. Click Sign Up. You can now view and download portions of your medical record. 10. Click the Download Summary menu link to download a portable copy of your medical information. If you have questions, please visit the Frequently Asked Questions section of the [employer] website. Remember, [employer] is NOT to be used for urgent needs. For medical emergencies, dial 911. Now available from your iPhone and Android! Please provide this summary of care documentation to your next provider. Your primary care clinician is listed as LINDSEY Tuttle. If you have any questions after today's visit, please call [phone].

## 2017-12-05 NOTE — PROGRESS NOTES
[facility] Progress Note:   F/up Physician Visit for the VLCD / LCD Program    CC: Weight Management    Donaldo Hayden is a 30 y.o. female who is here for her f/up physician visit for the [facility].    Weight History  Ideal body weight: 120 lb 9.5 oz (54.7 kg)  Adjusted ideal body weight: 138 lb 5.7 oz (62.8 kg) (Based on [address] [facility])    Wt Readings from Last 10 Encounters:   12/05/17 165 lb (74.8 kg)   11/28/17 170 lb 9.6 oz (77.4 kg)   11/21/17 173 lb 12.8 oz (78.8 kg)   11/14/17 175 lb 8 oz (79.6 kg)   11/08/17 178 lb 8 oz (81 kg)   10/31/17 183 lb 12.8 oz (83.4 kg)   10/24/17 187 lb 1.6 oz (84.9 kg)   06/24/16 191 lb 6.4 oz (86.8 kg)   02/26/16 180 lb (81.6 kg)   09/14/15 177 lb (80.3 kg)       Weight Metrics 12/5/2017 12/5/2017 11/28/2017 11/21/2017 11/14/2017 11/8/2017 11/7/2017   Weight - 165 lb 170 lb 9.6 oz 173 lb 12.8 oz 175 lb 8 oz - 178 lb 8 oz   Waist Measure Inches 28 - 29 30 30 31 -   Body Fat % 28 - 29 28 29 30.5 -   BMI - 28.32 kg/m2 29.28 kg/m2 29.83 kg/m2 30.12 kg/m2 - 30.64 kg/m2         Medications Currently Taking  Outpatient Prescriptions Marked as Taking for the 12/5/17 encounter (Office Visit) with June Andino, DO   Medication Sig Dispense Refill    etonogestrel (NEXPLANON) 68 mg impl by SubDERmal route. Participation   Have you been attending class on a regular basis? yes    Review of Systems  Since your last visit, have you experienced any complications? yes  If yes, please list: Some lightheadedness. Patient reports once she ate one meal she felt better and has not had dizziness since. Are you taking an appetite suppressant? no  If so:  Do you need a refill? no  Are you experiencine any Chest Pain, Palpitations or Dizziness? no    BP Readings from Last 3 Encounters:   12/05/17 125/78   11/28/17 112/82   11/21/17 121/78           Have you received any other medical care this week? no  If yes, where and for what?        Have you discontinued or changed any medicine or dose of your medicine(s) since your last visit with Dr Bj Frederick? no    If yes, where and for what? Diet  How many ounces of calorie-free liquids did you consume each day? 64 oz    How many meal replacements did you take each day? 4    Did you have any problems adhering to the program?  no   If yes, please explain:      Exercise  Aerobic exercise: 90 min  Resistance exercise: 0 workouts / week  Any discomfort?  no     If yes, where? Objective  Visit Vitals    /78    Pulse 71    Temp 97.6 °F (36.4 °C) (Oral)    Resp 18    Ht 5' 4\" (1.626 m)    Wt 165 lb (74.8 kg)    LMP 11/30/2017    BMI 28.32 kg/m2     Patient's last menstrual period was 11/30/2017. Physical Exam  Appearance: Overweight, A&O x 3, NAD  HEENT:  NC/AT, EOMI, PERRL, No scleral icterus    Assessment / Plan    No diagnosis found. 1.  Weight management    improved   Progress was reviewed with patient   Goal(s) for next appointment:   Keep up the good work        2. Labs    Latest results reviewed with patient   Routine monitoring labs ordered yes    -Lab slip given to pt for off-site [facility] labs no    10 minutes of the 15 minutes face to face time with Desi consisted of counseling & coordinating and/or discussing treatment plans in reference to her There were no encounter diagnoses.

## 2017-12-12 ENCOUNTER — CLINICAL SUPPORT (OUTPATIENT)
Dept: FAMILY MEDICINE CLINIC | Age: 37
End: 2017-12-12

## 2017-12-12 VITALS
HEART RATE: 78 BPM | HEIGHT: 64 IN | WEIGHT: 171.6 LBS | DIASTOLIC BLOOD PRESSURE: 86 MMHG | BODY MASS INDEX: 29.3 KG/M2 | SYSTOLIC BLOOD PRESSURE: 134 MMHG

## 2017-12-12 DIAGNOSIS — E66.3 OVERWEIGHT (BMI 25.0-29.9): Primary | ICD-10-CM

## 2017-12-12 NOTE — PROGRESS NOTES
Progress Note: Weekly Education Class in the [facility]   Is there anything that you or the patient needs to let Dr Carla Amaral know about? no  Over the past week, have you experienced any side-effects? no    Paty Quispe is a 30 y.o. female who is enrolled in [facility]    Visit Vitals    /86    Pulse 78    Ht 5' 4\" (1.626 m)    Wt 171 lb 9.6 oz (77.8 kg)    LMP 11/30/2017    BMI 29.46 kg/m2     Weight Metrics 12/12/2017 12/5/2017 12/5/2017 11/28/2017 11/21/2017 11/14/2017 11/8/2017   Weight 171 lb 9.6 oz - 165 lb 170 lb 9.6 oz 173 lb 12.8 oz 175 lb 8 oz -   Waist Measure Inches 29 28 - 29 30 30 31   Body Fat % 29 28 - 29 28 29 30.5   BMI 29.46 kg/m2 - 28.32 kg/m2 29.28 kg/m2 29.83 kg/m2 30.12 kg/m2 -         Have you received any other medical care this week? no  If yes, where and for what? Have you had any change in your medications since your last visit? no  If yes what? Did you have any problems adhering to the program last week? no  If yes, please explain:       Eating Habits Over Last Week:  Did you take in 64 oz of non-caloric fluids? yes     Did you consume your 4 meal replacements each day?  yes       Physical Activity Over the Past Week:    Aerobic exercise: 90 min  Resistance exercise: 0 workouts / week

## 2017-12-19 ENCOUNTER — CLINICAL SUPPORT (OUTPATIENT)
Dept: FAMILY MEDICINE CLINIC | Age: 37
End: 2017-12-19

## 2017-12-19 VITALS
SYSTOLIC BLOOD PRESSURE: 138 MMHG | DIASTOLIC BLOOD PRESSURE: 78 MMHG | BODY MASS INDEX: 29.26 KG/M2 | WEIGHT: 171.4 LBS | HEIGHT: 64 IN | HEART RATE: 76 BPM

## 2017-12-19 DIAGNOSIS — E66.3 OVERWEIGHT (BMI 25.0-29.9): Primary | ICD-10-CM

## 2018-02-15 ENCOUNTER — DOCUMENTATION ONLY (OUTPATIENT)
Dept: FAMILY MEDICINE CLINIC | Age: 38
End: 2018-02-15

## 2018-02-15 NOTE — PROGRESS NOTES
Patient is outside of attendance policy for education, medical monitoring, and/or physician followups Patient is automatically discharged from the Medical Weight Loss Program based on the required attendance policy and signed agreement to the policies upon enrollment. Attendance Policy and Discharge Actions listed in the Agreement Form are as follows:  During the Reducing Phases, Patients will be allowed to miss no more than two (2) sessions in four (4) months. These absences include excused as well as unexcused absences. Patients missing more than two (2) sessions within any four-month period without communicating with the staff will be automatically dismissed from the Medically Supervised Weight Loss program. Meaning:  - All future appointments with [facility] physician will be automatically cancelled. - Patients will no longer be eligible to purchase [employer] products. Close medical supervision is essential.  - There may be a waiting period or Orientation repeat required for re-entry into the program.  No future appointments.

## 2020-01-28 ENCOUNTER — OFFICE VISIT (OUTPATIENT)
Dept: FAMILY MEDICINE CLINIC | Age: 40
End: 2020-01-28

## 2020-01-28 VITALS
HEART RATE: 91 BPM | BODY MASS INDEX: 35 KG/M2 | HEIGHT: 64 IN | RESPIRATION RATE: 18 BRPM | OXYGEN SATURATION: 99 % | SYSTOLIC BLOOD PRESSURE: 125 MMHG | DIASTOLIC BLOOD PRESSURE: 89 MMHG | TEMPERATURE: 97.4 F | WEIGHT: 205 LBS

## 2020-01-28 DIAGNOSIS — R10.13 DYSPEPSIA: ICD-10-CM

## 2020-01-28 DIAGNOSIS — R73.03 PREDIABETES: Primary | ICD-10-CM

## 2020-01-28 DIAGNOSIS — R10.13 ABDOMINAL PAIN, EPIGASTRIC: ICD-10-CM

## 2020-01-28 DIAGNOSIS — E78.5 ELEVATED LIPIDS: ICD-10-CM

## 2020-01-28 DIAGNOSIS — E55.9 VITAMIN D DEFICIENCY: ICD-10-CM

## 2020-01-28 RX ORDER — PHENOL/SODIUM PHENOLATE
20 AEROSOL, SPRAY (ML) MUCOUS MEMBRANE DAILY
Qty: 90 TAB | Refills: 3 | Status: SHIPPED | OUTPATIENT
Start: 2020-01-28

## 2020-01-28 RX ORDER — ACETAMINOPHEN 325 MG/1
TABLET ORAL
COMMUNITY

## 2020-01-28 NOTE — PROGRESS NOTES
Yfn Gastelum is a 42 y.o. female and presents with Abdominal Pain (upper quadrant, specialy after eating or eating a big meal. More like epigastric. ) and Weight Gain       Subjective:    abd pain- since november- epigastric- worse after eating- also some acid reflux. No meds tried except ibuprofen or tylenol tried. Had cholecystectomy. Worse with OJ or red sauce. No EtOH. No f/c. No n/v. No diarrhea. Also some weight gain- chronic. Normal TSH in the past. Was in metabolic program.   Prediabetesno polyuria or polydipsia  Vitamin D deficiencyno bone pain or cramping  Assessment/Plan:      abd pain-suspect dyspepsia at this pointwe will give trial of Prilosec to use for 2 weeks and see how this does. We will also draw labs as below to follow-up on her old problems and have her come back for short follow-up to discuss these  Obesity encouraged weight loss at 1/2 to 1 pound per week with caloric restriction regular weights    Diagnoses and all orders for this visit:    1. Prediabetes  -     HEMOGLOBIN A1C WITH EAG; Future    2. Vitamin D deficiency  -     VITAMIN D, 25 HYDROXY; Future    3. Elevated lipids  -     METABOLIC PANEL, COMPREHENSIVE; Future  -     TSH 3RD GENERATION; Future  -     LIPID PANEL; Future    4. Abdominal pain, epigastric  -     METABOLIC PANEL, COMPREHENSIVE; Future  -     CBC WITH AUTOMATED DIFF; Future  -     LIPASE; Future    5. Dyspepsia    Other orders  -     Omeprazole delayed release (PRILOSEC D/R) 20 mg tablet; Take 1 Tab by mouth daily.  Use for 2 weeks at a time for dyspepsia  -     HEMOGLOBIN A1C W/O EAG        RTC in next avail  Orders Placed This Encounter    METABOLIC PANEL, COMPREHENSIVE     Standing Status:   Future     Standing Expiration Date:   1/28/2021    CBC WITH AUTOMATED DIFF     Standing Status:   Future     Standing Expiration Date:   1/28/2021    TSH 3RD GENERATION     Standing Status:   Future     Standing Expiration Date:   1/28/2021    LIPID PANEL Standing Status:   Future     Standing Expiration Date:   1/28/2021    HEMOGLOBIN A1C WITH EAG     Standing Status:   Future     Standing Expiration Date:   1/28/2021    VITAMIN D, 25 HYDROXY     Standing Status:   Future     Standing Expiration Date:   1/27/2021    LIPASE     Standing Status:   Future     Standing Expiration Date:   1/28/2021    acetaminophen (TYLENOL) 325 mg tablet     Sig: Take  by mouth every four (4) hours as needed for Pain. ROS:  Negative except as mentioned above  Cardiac-  Pulmonary-  GI-    SH:  Social History     Tobacco Use    Smoking status: Never Smoker    Smokeless tobacco: Never Used   Substance Use Topics    Alcohol use: No    Drug use: No         Medications/Allergies:  Current Outpatient Medications on File Prior to Visit   Medication Sig Dispense Refill    acetaminophen (TYLENOL) 325 mg tablet Take  by mouth every four (4) hours as needed for Pain.  etonogestrel (NEXPLANON) 68 mg impl by SubDERmal route. No current facility-administered medications on file prior to visit. No Known Allergies    Objective:  Visit Vitals  /89   Pulse 91   Temp 97.4 °F (36.3 °C) (Oral)   Resp 18   Ht 5' 4\" (1.626 m)   Wt 205 lb (93 kg)   SpO2 99%   BMI 35.19 kg/m²    Body mass index is 35.19 kg/m². Constitutional: Well developed, nourished, no distress, alert   HENT: Exterior ears and tympanic membranes normal bilaterally. Supple neck. No thyromegaly or lymphadenopathy. Oropharynx clear and moist mucous membranes. Eyes: Conjunctiva normal. PERRL. CV: S1, S2.  RRR. No murmurs/rubs. No thrills palpated. No carotid bruits. Intact distal pulses. No edema. Pulm: No abnormalities on inspection. Clear to auscultation bilaterally. No wheezing/rhonchi. Normal effort. GI: Soft, nontender, nondistended. Normal active bowel sounds. No  masses on palpation. No hepatosplenomegaly.

## 2020-01-28 NOTE — PATIENT INSTRUCTIONS
Indigestion (Dyspepsia or Heartburn): Care Instructions Your Care Instructions Sometimes it can be hard to pinpoint the cause of indigestion. (It is also called dyspepsia or heartburn.) Most cases of an upset stomach with bloating, burning, burping, and nausea are minor and go away within several hours. Home treatment and over-the-counter medicine often are able to control symptoms. But if you take medicine to relieve your indigestion without making diet and lifestyle changes, your symptoms are likely to return again and again. If you get indigestion often, it may be a sign of a more serious medical problem. Be sure to follow up with your doctor, who may want to do tests to be sure of the cause of your indigestion. Follow-up care is a key part of your treatment and safety. Be sure to make and go to all appointments, and call your doctor if you are having problems. It's also a good idea to know your test results and keep a list of the medicines you take. How can you care for yourself at home? · Your doctor may recommend over-the-counter medicine. For mild or occasional indigestion, antacids such as Gaviscon, Mylanta, Maalox, or Tums, may help. Be safe with medicines. Be careful when you take over-the-counter antacid medicines. Many of these medicines have aspirin in them. Read the label to make sure that you are not taking more than the recommended dose. Too much aspirin can be harmful. · Your doctor also may recommend over-the-counter acid reducers, such as Pepcid AC, Tagamet HB, Zantac 75, or Prilosec. Read and follow all instructions on the label. If you use these medicines often, talk with your doctor. · Change your eating habits. ? It's best to eat several small meals instead of two or three large meals. ? After you eat, wait 2 to 3 hours before you lie down. ? Chocolate, mint, and alcohol can make GERD worse. ?  Spicy foods, foods that have a lot of acid (like tomatoes and oranges), and coffee can make GERD symptoms worse in some people. If your symptoms are worse after you eat a certain food, you may want to stop eating that food to see if your symptoms get better. · Do not smoke or chew tobacco. Smoking can make GERD worse. If you need help quitting, talk to your doctor about stop-smoking programs and medicines. These can increase your chances of quitting for good. · If you have GERD symptoms at night, raise the head of your bed 6 to 8 inches. You can do this by putting the frame on blocks or placing a foam wedge under the head of your mattress. (Adding extra pillows does not work.) · Do not wear tight clothing around your middle. · Lose weight if you need to. Losing just 5 to 10 pounds can help. · Do not take anti-inflammatory medicines, such as aspirin, ibuprofen (Advil, Motrin), or naproxen (Aleve). These can irritate the stomach. If you need a pain medicine, try acetaminophen (Tylenol), which does not cause stomach upset. When should you call for help? Call your doctor now or seek immediate medical care if: 
  · You have new or worse belly pain.  
  · You are vomiting.  
 Watch closely for changes in your health, and be sure to contact your doctor if: 
  · You have new or worse symptoms of indigestion.  
  · You have trouble or pain swallowing.  
  · You are losing weight.  
  · You do not get better as expected. Where can you learn more? Go to http://rosales-landy.info/. Enter U053 in the search box to learn more about \"Indigestion (Dyspepsia or Heartburn): Care Instructions. \" Current as of: November 7, 2018 Content Version: 12.2 © 3987-5826 [employer]. Care instructions adapted under license by [employer] (which disclaims liability or warranty for this information).  If you have questions about a medical condition or this instruction, always ask your healthcare professional. [employer] disclaims any warranty or liability for your use of this information.
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
## 2020-01-29 LAB
25(OH)D3 SERPL-MCNC: 10.1 NG/ML (ref 32–100)
A-G RATIO,AGRAT: 1.6 RATIO (ref 1.1–2.6)
ABSOLUTE LYMPHOCYTE COUNT, 10803: 3.7 K/UL (ref 1–4.8)
ALBUMIN SERPL-MCNC: 4.5 G/DL (ref 3.5–5)
ALP SERPL-CCNC: 70 U/L (ref 25–115)
ALT SERPL-CCNC: 21 U/L (ref 5–40)
ANION GAP SERPL CALC-SCNC: 13 MMOL/L
AST SERPL W P-5'-P-CCNC: 21 U/L (ref 10–37)
AVG GLU, 10930: 119 MG/DL (ref 91–123)
BASOPHILS # BLD: 0 K/UL (ref 0–0.2)
BASOPHILS NFR BLD: 0 % (ref 0–2)
BILIRUB SERPL-MCNC: 0.3 MG/DL (ref 0.2–1.2)
BUN SERPL-MCNC: 6 MG/DL (ref 6–22)
CALCIUM SERPL-MCNC: 10.1 MG/DL (ref 8.4–10.5)
CHLORIDE SERPL-SCNC: 104 MMOL/L (ref 98–110)
CHOLEST SERPL-MCNC: 209 MG/DL (ref 110–200)
CO2 SERPL-SCNC: 24 MMOL/L (ref 20–32)
CREAT SERPL-MCNC: 0.7 MG/DL (ref 0.5–1.2)
EOSINOPHIL # BLD: 0.1 K/UL (ref 0–0.5)
EOSINOPHIL NFR BLD: 1 % (ref 0–6)
ERYTHROCYTE [DISTWIDTH] IN BLOOD BY AUTOMATED COUNT: 13.4 % (ref 10–15.5)
GFRAA, 66117: >60
GFRNA, 66118: >60
GLOBULIN,GLOB: 2.9 G/DL (ref 2–4)
GLUCOSE SERPL-MCNC: 70 MG/DL (ref 70–99)
GRANULOCYTES,GRANS: 57 % (ref 40–75)
HBA1C MFR BLD HPLC: 5.8 % (ref 4.8–5.6)
HCT VFR BLD AUTO: 43.8 % (ref 35.1–46.5)
HDLC SERPL-MCNC: 4.4 MG/DL (ref 0–5)
HDLC SERPL-MCNC: 47 MG/DL
HGB BLD-MCNC: 13.4 G/DL (ref 11.7–15.5)
LDL/HDL RATIO,LDHD: 2.8
LDLC SERPL CALC-MCNC: 132 MG/DL (ref 50–99)
LIPASE SERPL-CCNC: <20 U/L (ref 7–60)
LYMPHOCYTES, LYMLT: 35 % (ref 20–45)
MCH RBC QN AUTO: 26 PG (ref 26–34)
MCHC RBC AUTO-ENTMCNC: 31 G/DL (ref 31–36)
MCV RBC AUTO: 85 FL (ref 81–99)
MONOCYTES # BLD: 0.8 K/UL (ref 0.1–1)
MONOCYTES NFR BLD: 7 % (ref 3–12)
NEUTROPHILS # BLD AUTO: 6 K/UL (ref 1.8–7.7)
NON-HDL CHOLESTEROL, 011976: 162 MG/DL
PLATELET # BLD AUTO: 463 K/UL (ref 140–440)
PMV BLD AUTO: 9.1 FL (ref 9–13)
POTASSIUM SERPL-SCNC: 4.3 MMOL/L (ref 3.5–5.5)
PROT SERPL-MCNC: 7.4 G/DL (ref 6.4–8.3)
RBC # BLD AUTO: 5.15 M/UL (ref 3.8–5.2)
SODIUM SERPL-SCNC: 141 MMOL/L (ref 133–145)
TRIGL SERPL-MCNC: 150 MG/DL (ref 40–149)
TSH SERPL DL<=0.005 MIU/L-ACNC: 1.54 MCU/ML (ref 0.27–4.2)
VLDLC SERPL CALC-MCNC: 30 MG/DL (ref 8–30)
WBC # BLD AUTO: 10.6 K/UL (ref 4–11)

## 2020-03-02 ENCOUNTER — OFFICE VISIT (OUTPATIENT)
Dept: FAMILY MEDICINE CLINIC | Age: 40
End: 2020-03-02

## 2020-03-02 VITALS
HEART RATE: 90 BPM | BODY MASS INDEX: 33.97 KG/M2 | SYSTOLIC BLOOD PRESSURE: 118 MMHG | TEMPERATURE: 96.6 F | WEIGHT: 199 LBS | HEIGHT: 64 IN | RESPIRATION RATE: 17 BRPM | OXYGEN SATURATION: 97 % | DIASTOLIC BLOOD PRESSURE: 77 MMHG

## 2020-03-02 DIAGNOSIS — R73.03 PREDIABETES: ICD-10-CM

## 2020-03-02 DIAGNOSIS — E78.5 ELEVATED LIPIDS: ICD-10-CM

## 2020-03-02 DIAGNOSIS — E66.9 OBESITY (BMI 30-39.9): ICD-10-CM

## 2020-03-02 DIAGNOSIS — E55.9 VITAMIN D DEFICIENCY: ICD-10-CM

## 2020-03-02 DIAGNOSIS — E55.9 VITAMIN D DEFICIENCY: Primary | ICD-10-CM

## 2020-03-02 NOTE — PROGRESS NOTES
Vern Nash is a 42 y.o. female and presents with Follow Up Chronic Condition; Abdominal Pain (Still hurting, mid upper part of her abdomen. ); and Results (labs)       Subjective:    abd pain- improved now- - epigastric- worse after eating- also some acid reflux. No meds tried except ibuprofen or tylenol tried. Had cholecystectomy. Worse with OJ or red sauce. No EtOH. No f/c. No n/v. No diarrhea. Also some weight gain- chronic. Normal TSH in the past. Was in metabolic program.   Prediabetesno polyuria or polydipsia  Vitamin D deficiencyno bone pain or cramping    Assessment/Plan:      abd pain-suspect dyspepsia - improved  Obesity encouraged weight loss at 1/2 to 1 pound per week with caloric restriction regular weights  Lipidemia- mildly elevated. Vitamin D deficiency high-dose vitamin D sky and follow-up  Prediabetesdiscussed and encouraged low-carb diet weight loss and exercise. Diagnoses and all orders for this visit:    1. Vitamin D deficiency  -     cholecalciferol, vitamin D3, (VITAMIN D3) 1,250 mcg (50,000 unit) tablet; Take 1 Tab by mouth every seven (7) days. Take for 12 weeks. -     VITAMIN D, 25 HYDROXY; Future    2. Prediabetes  -     HEMOGLOBIN A1C WITH EAG; Future    3. Obesity (BMI 30-39.9)    4. Elevated lipids        RTC in next avail  Orders Placed This Encounter    HEMOGLOBIN A1C WITH EAG     Standing Status:   Future     Number of Occurrences:   1     Standing Expiration Date:   3/3/2021    VITAMIN D, 25 HYDROXY     Standing Status:   Future     Number of Occurrences:   1     Standing Expiration Date:   3/2/2021    cholecalciferol, vitamin D3, (VITAMIN D3) 1,250 mcg (50,000 unit) tablet     Sig: Take 1 Tab by mouth every seven (7) days. Take for 12 weeks. Dispense:  12 Tab     Refill:  1               ROS:  Negative except as mentioned above  Cardiac- no chest pain or palpitations  Pulmonary- no sob or wheezes  GI- no n/v or diarrhea.       SH:  Social History     Tobacco Use    Smoking status: Never Smoker    Smokeless tobacco: Never Used   Substance Use Topics    Alcohol use: No    Drug use: No         Medications/Allergies:  Current Outpatient Medications on File Prior to Visit   Medication Sig Dispense Refill    acetaminophen (TYLENOL) 325 mg tablet Take  by mouth every four (4) hours as needed for Pain.  Omeprazole delayed release (PRILOSEC D/R) 20 mg tablet Take 1 Tab by mouth daily. Use for 2 weeks at a time for dyspepsia 90 Tab 3    etonogestrel (NEXPLANON) 68 mg impl by SubDERmal route. No current facility-administered medications on file prior to visit. No Known Allergies    Objective:  Visit Vitals  /77   Pulse 90   Temp 96.6 °F (35.9 °C) (Oral)   Resp 17   Ht 5' 4\" (1.626 m)   Wt 199 lb (90.3 kg)   SpO2 97%   BMI 34.16 kg/m²    Body mass index is 34.16 kg/m². Constitutional: Well developed, nourished, no distress, alert, obese   Eyes: Conjunctiva normal. PERRL. CV: S1, S2.  RRR. No murmurs/rubs. No edema. Pulm: No abnormalities on inspection. Clear to auscultation bilaterally. No wheezing/rhonchi. Normal effort. GI: Soft, nontender, nondistended.   Normal active bowel sounds

## 2020-03-02 NOTE — PATIENT INSTRUCTIONS
Learning About Vitamin D Why is it important to get enough vitamin D? Your body needs vitamin D to absorb calcium. Calcium keeps your bones and muscles, including your heart, healthy and strong. If your muscles don't get enough calcium, they can cramp, hurt, or feel weak. You may have long-term (chronic) muscle aches and pains. If you don't get enough vitamin D throughout life, you have an increased chance of having thin and brittle bones (osteoporosis) in your later years. Children who don't get enough vitamin D may not grow as much as others their age. They also have a chance of getting a rare disease called rickets. It causes weak bones. Vitamin D and calcium are added to many foods. And your body uses sunshine to make its own vitamin D. How much vitamin D do you need? The [place] [employer] recommends that people ages 3 through 79 get 600 IU (international units) every day. Adults 71 and older need 800 IU every day. Blood tests for vitamin D can check your vitamin D level. But there is no standard normal range used by all laboratories. You're likely getting enough vitamin D if your levels are in the range of 20 to 50 ng/mL. How can you get more vitamin D? Foods that contain vitamin D include: 
· [place], tuna, and mackerel. These are some of the best foods to eat when you need to get more vitamin D. 
· Cheese, egg yolks, and beef liver. These foods have vitamin D in small amounts. · Milk, soy drinks, orange juice, yogurt, margarine, and some kinds of cereal have vitamin D added to them. Some people don't make vitamin D as well as others. They may have to take extra care in getting enough vitamin D. Things that reduce how much vitamin D your body makes include: · Dark skin, such as many  Americans have. · Age, especially if you are older than 72. · Digestive problems, such as Crohn's or celiac disease. · Liver and kidney disease. Some people who do not get enough vitamin D may need supplements. Are there any risks from taking vitamin D? 
· Too much vitamin D: 
? Can damage your kidneys. ? Can cause nausea and vomiting, constipation, and weakness. ? Raises the amount of calcium in your blood. If this happens, you can get confused or have an irregular heart rhythm. · Vitamin D may interact with other medicines. Tell your doctor about all of the medicines you take, including over-the-counter drugs, herbs, and pills. Tell your doctor about all of your current medical problems. Where can you learn more? Go to http://rosalesSpeed Dating by Chantilly Lacelandy.info/. Enter V530 in the search box to learn more about \"Learning About Vitamin D.\" 
Current as of: November 7, 2018 Content Version: 12.2 © 2197-8959 [employer]. Care instructions adapted under license by [employer] (which disclaims liability or warranty for this information). If you have questions about a medical condition or this instruction, always ask your healthcare professional. [place] [address] any warranty or liability for your use of this information. Prediabetes: Care Instructions Your Care Instructions Prediabetes is a warning sign that you are at risk for getting type 2 diabetes. It means that your blood sugar is higher than it should be. The food you eat turns into sugar, which your body uses for energy. Normally, an organ called the pancreas makes insulin, which allows the sugar in your blood to get into your body's cells. But when your body can't use insulin the right way, the sugar doesn't move into cells. It stays in your blood instead. This is called insulin resistance. The buildup of sugar in the blood causes prediabetes. The good news is that lifestyle changes may help you get your blood sugar back to normal and help you avoid or delay diabetes. Follow-up care is a key part of your treatment and safety. Be sure to make and go to all appointments, and call your doctor if you are having problems. It's also a good idea to know your test results and keep a list of the medicines you take. How can you care for yourself at home? · Watch your weight. A healthy weight helps your body use insulin properly. · Limit the amount of calories, sweets, and unhealthy fat you eat. Ask your doctor if you should see a dietitian. A registered dietitian can help you create meal plans that fit your lifestyle. · Get at least 30 minutes of exercise on most days of the week. Exercise helps control your blood sugar. It also helps you maintain a healthy weight. Walking is a good choice. You also may want to do other activities, such as running, swimming, cycling, or playing tennis or team sports. · Do not smoke. Smoking can make prediabetes worse. If you need help quitting, talk to your doctor about stop-smoking programs and medicines. These can increase your chances of quitting for good. · If your doctor prescribed medicines, take them exactly as prescribed. Call your doctor if you think you are having a problem with your medicine. You will get more details on the specific medicines your doctor prescribes. When should you call for help? Watch closely for changes in your health, and be sure to contact your doctor if: 
  · You have any symptoms of diabetes. These may include: 
? Being thirsty more often. ? Urinating more. ? Being hungrier. ? Losing weight. ? Being very tired. ? Having blurry vision.  
  · You have a wound that will not heal.  
  · You have an infection that will not go away.  
  · You have problems with your blood pressure.  
  · You want more information about diabetes and how you can keep from getting it. Where can you learn more? Go to http://rosales-landy.info/.  
Enter I222 in the search box to learn more about \"Prediabetes: Care Instructions. \" Current as of: April 16, 2019 Content Version: 12.2 © 9686-1502 [employer]. Care instructions adapted under license by [employer] (which disclaims liability or warranty for this information). If you have questions about a medical condition or this instruction, always ask your healthcare professional. [address] any warranty or liability for your use of this information. Learning About Carbohydrates What are carbohydrates? Carbohydrates are an important nutrient you get from food. It's a great source of energy for your body and helps your brain and nervous system work properly. How does your body use carbohydrates? After you eat food with carbs in it, your body digests the carbohydrates and turns them into a kind of sugar that goes into your blood. The blood carries this sugar to the cells in your body. The cells use the sugar to give you energy. Extra sugar is stored in the cells for later use. If it isn't used, it turns into fat. Where do carbohydrates come from? The healthiest carbohydrate choices are breads, cereals, and pastas made with whole grains; brown rice; low-fat dairy products; vegetables; legumes such as peas, lentils, and beans; and fruits. Foods made from refined flour, including bread, pasta, doughnuts, cookies, and desserts, also contain carbohydrates. So do sweets such as candy and soda. How can you get the right kind and amount of carbs? Eating too much of anything can lead to weight gain. And that can lead to other health problems. Here are some tips to help you eat the right amount of the right kind of carbs so you have the nutrition and the energy you need: 
· Eat 3 to 8 servings of grains (breads, cereals, rice, pasta) each day.  For example, a serving is 1 slice of bread, 1 cup of boxed cereal, or ½ cup of cooked rice, cooked pasta, or cooked cereal. Go to www.choosemyplate.gov to learn how many servings you need. ? Buy bread that lists whole wheat (or other whole grains), stone-ground wheat, or cracked wheat as the first ingredient. ? Eat brown rice, bulgur, or millet instead of white rice. ? Eat pasta and cereals made from whole grain flour instead of refined flour. · Eat several servings a day of fresh fruits and vegetables. These include raspberries, apples, figs, oranges, pears, prunes, broccoli, brussels sprouts, carrots, corn, peas, and beans. And there are lots of other fruits and vegetables to choose from. · Limit the amount of candy, desserts, and soda in your diet. Where can you learn more? Go to http://rosales-landy.info/. Enter F199 in the search box to learn more about \"Learning About Carbohydrates. \" Current as of: November 7, 2018 Content Version: 12.2 © 2938-0119 [employer]. Care instructions adapted under license by [employer] (which disclaims liability or warranty for this information). If you have questions about a medical condition or this instruction, always ask your healthcare professional. [address] any warranty or liability for your use of this information.

## 2020-03-02 NOTE — PROGRESS NOTES
1. Have you been to the ER, urgent care clinic since your last visit? Hospitalized since your last visit? No.     2. Have you seen or consulted any other health care providers outside of the [address] [place] [address] since your last visit? Include any pap smears or colon screening. No.      Chief Complaint   Patient presents with    Follow Up Chronic Condition    Abdominal Pain     Still hurting, mid upper part of her abdomen.      Results     labs

## 2020-08-26 LAB
25(OH)D3 SERPL-MCNC: 26.2 NG/ML (ref 32–100)
AVG GLU, 10930: 108 MG/DL (ref 91–123)
HBA1C MFR BLD HPLC: 5.4 % (ref 4.8–5.6)

## 2020-09-02 ENCOUNTER — VIRTUAL VISIT (OUTPATIENT)
Dept: FAMILY MEDICINE CLINIC | Age: 40
End: 2020-09-02

## 2020-09-02 DIAGNOSIS — R73.03 PREDIABETES: ICD-10-CM

## 2020-09-02 DIAGNOSIS — R73.03 PREDIABETES: Primary | ICD-10-CM

## 2020-09-02 DIAGNOSIS — E55.9 VITAMIN D DEFICIENCY: ICD-10-CM

## 2020-09-02 DIAGNOSIS — E78.2 MIXED HYPERLIPIDEMIA: ICD-10-CM

## 2020-09-02 RX ORDER — ACETAMINOPHEN 500 MG
2000 TABLET ORAL DAILY
Qty: 90 CAP | Refills: 3 | Status: SHIPPED | OUTPATIENT
Start: 2020-09-02

## 2020-09-02 NOTE — PROGRESS NOTES
Zac Amato is a 42 y.o. female who was seen by synchronous (real-time) audio-video technology on 9/2/2020 for Follow Up Chronic Condition and Results        Assessment & Plan:   Diagnoses and all orders for this visit:    1. Prediabetes  -     HEMOGLOBIN A1C WITH EAG; Future    2. Vitamin D deficiency  -     VITAMIN D, 25 HYDROXY; Future  -     METABOLIC PANEL, BASIC; Future    3. Mixed hyperlipidemia  -     LIPID PANEL; Future  -     METABOLIC PANEL, BASIC; Future    Other orders  -     cholecalciferol (VITAMIN D3) (2,000 UNITS /50 MCG) cap capsule; Take 2,000 Units by mouth daily. abd pain-suspect dyspepsia - improved  Obesity continue weight loss at 1/2 to 1 pound per week with caloric restriction regular weights  Lipidemia- mildly elevated. Vitamin D deficiency off high-dose vitamin D discussed and follow-up-   Prediabetes discussed and encouraged low-carb diet weight loss and exercise.     712  Subjective:     abd pain- improved now- - epigastric- worse after eating- also some acid reflux. No meds tried except ibuprofen or tylenol tried. Had cholecystectomy. Worse with OJ or red sauce. No EtOH. No f/c. No n/v. No diarrhea. Obesity- lost 60 lbs- chronic. Normal TSH in the past. Was in metabolic program.   Prediabetesno polyuria or polydipsia  Vitamin D deficiencyno bone pain or cramping- on MVI-but not on a specific supplement. Prior to Admission medications    Medication Sig Start Date End Date Taking? Authorizing Provider   acetaminophen (TYLENOL) 325 mg tablet Take  by mouth every four (4) hours as needed for Pain. Yes Provider, Historical   Omeprazole delayed release (PRILOSEC D/R) 20 mg tablet Take 1 Tab by mouth daily. Use for 2 weeks at a time for dyspepsia 1/28/20  Yes Nohemi Rock MD   etonogestrel (NEXPLANON) 68 mg impl by SubDERmal route.    Yes Provider, Historical   cholecalciferol, vitamin D3, (VITAMIN D3) 1,250 mcg (50,000 unit) tablet Take 1 Tab by mouth every seven (7) days. Take for 12 weeks. 3/2/20   Savage Baez MD     Patient Active Problem List   Diagnosis Code    History of cholecystectomy Z90.49    Obesity (BMI 30-39. 9) E66.9    Vitamin D deficiency E55.9    Prediabetes R73.03    Elevated lipids E78.5    Overweight (BMI 25.0-29. 9) [id number]    Hx of obesity Z86.39     Patient Active Problem List    Diagnosis Date Noted    Overweight (BMI 25.0-29.9) 12/05/2017    Hx of obesity 12/05/2017    Obesity (BMI 30-39.9) 10/24/2017    Vitamin D deficiency 10/24/2017    Prediabetes 10/24/2017    Elevated lipids 10/24/2017    History of cholecystectomy 12/17/2014     Current Outpatient Medications   Medication Sig Dispense Refill    acetaminophen (TYLENOL) 325 mg tablet Take  by mouth every four (4) hours as needed for Pain.  Omeprazole delayed release (PRILOSEC D/R) 20 mg tablet Take 1 Tab by mouth daily. Use for 2 weeks at a time for dyspepsia 90 Tab 3    etonogestrel (NEXPLANON) 68 mg impl by SubDERmal route.  cholecalciferol, vitamin D3, (VITAMIN D3) 1,250 mcg (50,000 unit) tablet Take 1 Tab by mouth every seven (7) days. Take for 12 weeks. 12 Tab 1     No Known Allergies  History reviewed. No pertinent past medical history. Past Surgical History:   Procedure Laterality Date    HX CHOLECYSTECTOMY      gall stone removed from bile duct    HX OTHER SURGICAL       Family History   Problem Relation Age of Onset    Heart Disease Father         heart attack      Social History     Tobacco Use    Smoking status: Never Smoker    Smokeless tobacco: Never Used   Substance Use Topics    Alcohol use: No       ROS  Cardiac- no chest pain or palpitations  Pulmonary- no sob or wheezes  GI- no n/v or diarrhea. Objective:   No flowsheet data found.    General: alert, cooperative, no distress   Mental  status: normal mood, behavior, speech, dress, motor activity, and thought processes, able to follow commands   HENT: NCAT   Neck: no visualized mass   Resp: no respiratory distress   Neuro: no gross deficits   Skin: no discoloration or lesions of concern on visible areas   Psychiatric: normal affect, consistent with stated mood, no evidence of hallucinations     Additional exam findings: We discussed the expected course, resolution and complications of the diagnosis(es) in detail. Medication risks, benefits, costs, interactions, and alternatives were discussed as indicated. I advised her to contact the office if her condition worsens, changes or fails to improve as anticipated. She expressed understanding with the diagnosis(es) and plan. Carter Lawton, who was evaluated through a patient-initiated, synchronous (real-time) audio-video encounter, and/or her healthcare decision maker, is aware that it is a billable service, with coverage as determined by her insurance carrier. She provided verbal consent to proceed: Yes, and patient identification was verified. It was conducted pursuant to the emergency declaration under the [address] [place] [address], [address] [place] [address] authority and the [employer] General Act. A caregiver was present when appropriate. Ability to conduct physical exam was limited. I was in the office. The patient was at home.       Leonela Ledezma MD

## 2020-09-02 NOTE — PROGRESS NOTES
1. Have you been to the ER, urgent care clinic since your last visit? Hospitalized since your last visit? No    2. Have you seen or consulted any other health care providers outside of the [address] [place] [address] since your last visit? Include any pap smears or colon screening.  No    Chief Complaint   Patient presents with    Follow Up Chronic Condition    Results

## 2022-03-18 PROBLEM — E78.5 ELEVATED LIPIDS: Status: ACTIVE | Noted: 2017-10-24

## 2022-03-19 PROBLEM — E66.3 OVERWEIGHT (BMI 25.0-29.9): Status: ACTIVE | Noted: 2017-12-05

## 2022-03-19 PROBLEM — E66.9 OBESITY (BMI 30-39.9): Status: ACTIVE | Noted: 2017-10-24

## 2022-03-19 PROBLEM — Z86.39 HX OF OBESITY: Status: ACTIVE | Noted: 2017-12-05

## 2022-03-19 PROBLEM — R73.03 PREDIABETES: Status: ACTIVE | Noted: 2017-10-24

## 2022-03-20 PROBLEM — E55.9 VITAMIN D DEFICIENCY: Status: ACTIVE | Noted: 2017-10-24

## 2023-09-24 ENCOUNTER — HOSPITAL ENCOUNTER (EMERGENCY)
Facility: HOSPITAL | Age: 43
Discharge: HOME | End: 2023-09-25
Payer: COMMERCIAL

## 2023-09-24 ENCOUNTER — APPOINTMENT (OUTPATIENT)
Facility: HOSPITAL | Age: 43
End: 2023-09-24
Payer: COMMERCIAL

## 2023-09-24 VITALS
BODY MASS INDEX: 30.56 KG/M2 | WEIGHT: 179 LBS | HEIGHT: 64 IN | SYSTOLIC BLOOD PRESSURE: 126 MMHG | HEART RATE: 89 BPM | TEMPERATURE: 98.4 F | OXYGEN SATURATION: 100 % | DIASTOLIC BLOOD PRESSURE: 88 MMHG | RESPIRATION RATE: 16 BRPM
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
## 2023-09-24 DIAGNOSIS — S09.90XA INJURY OF HEAD, INITIAL ENCOUNTER: Primary | ICD-10-CM

## 2023-09-24 PROCEDURE — 72125 CT NECK SPINE W/O DYE: CPT

## 2023-09-24 PROCEDURE — 70450 CT HEAD/BRAIN W/O DYE: CPT

## 2023-09-24 PROCEDURE — 99284 EMERGENCY DEPT VISIT MOD MDM: CPT

## 2023-09-24 ASSESSMENT — ENCOUNTER SYMPTOMS
ABDOMINAL PAIN: 0
STRIDOR: 0
SHORTNESS OF BREATH: 0
EYE DISCHARGE: 0
WHEEZING: 0
EYE REDNESS: 0
NAUSEA: 1
SORE THROAT: 0
COUGH: 0
RHINORRHEA: 0
VOMITING: 0
BACK PAIN: 0

## 2023-09-24 ASSESSMENT — PAIN SCALES - GENERAL: PAINLEVEL_OUTOF10: 2

## 2023-09-24 ASSESSMENT — LIFESTYLE VARIABLES
HOW MANY STANDARD DRINKS CONTAINING ALCOHOL DO YOU HAVE ON A TYPICAL DAY: PATIENT DOES NOT DRINK
HOW OFTEN DO YOU HAVE A DRINK CONTAINING ALCOHOL: NEVER

## 2023-09-25 NOTE — ED NOTES
Patient moved to results waiting, patient noted to ambulate w/o deficits, alert and oriented to all aspects.       Yuniel Ramirez RN  09/24/23 [id number]

## 2023-09-25 NOTE — ED NOTES
Patient heading for CT, no changes in patient condition.   Remains a/o x 4, no distress, breathing normal.      Cassandra Novak RN  09/24/23 [id number]

## 2023-09-25 NOTE — ED NOTES
Pt fell in wet bath tub in [place], this AM. Pt could not catch her fall, landing partially out of tub, hitting her head on marble floor of bathroom with an audible \"crack. \"   Pt denies LOC or bleeding, but felt an immediate pain in head. Pain in multiple sides of head. Friend drove Pt all the way back from [place] (in [address] Came to ED for further evaluation. Ardie Castleman  [facility]  09/24/23 2033

## 2023-09-25 NOTE — ED TRIAGE NOTES
Pt fell in wet bath tub in [place], this AM. Pt could not catch her fall, landing partially out of tub, hitting her head on marble floor of bathroom with an audible \"crack. \"   Pt denies LOC or bleeding, but felt an immediate pain in head. Pain in multiple sides of head. Friend drove Pt all the way back from [place] (in [address] Came to ED for further evaluation.

## 2023-09-25 NOTE — ED PROVIDER NOTES
EMERGENCY DEPARTMENT HISTORY AND PHYSICAL EXAM    Date: 9/24/2023  Patient Name: Campos Parish    History of Presenting Illness     Chief Complaint   Patient presents with    Head Injury    Fall         History Provided By: patient     Chief Complaint: fall, head injury   Duration: this morning   Timing:  acute   Location: R temporal region   Quality: soreness, throbbing  Severity: moderate  Modifying Factors: none   Associated Symptoms: neck pain and stiffness nausea blurred vision      Additional History (Context): Campos Parish is a 38 y.o. female with no documented PMH who presents with c/o pain to the right temporal region after mechanical fall this morning. Patient states she slipped while trying to get into the shower and fell over the side of her bathtub striking her head on the floor. Patient denies loss of consciousness but states she did experience some blurred vision after the injury. Patient states her visual changes have improved. She also reports some neck pain/stiffness and nausea without vomiting. No other complaints reported at this time. PCP: Cari Leventhal, MD    No current facility-administered medications for this encounter. Current Outpatient Medications   Medication Sig Dispense Refill    acetaminophen (TYLENOL) 325 MG tablet Take by mouth every 4 hours as needed      Cholecalciferol 50 MCG (2000 UT) CAPS Take 2,000 Units by mouth daily      Cholecalciferol 1.25 MG (12515 UT) TABS Take 50,000 Units by mouth every 7 days      etonogestrel (NEXPLANON) 68 MG implant Inject into the skin      omeprazole (PRILOSEC OTC) 20 MG tablet Take 20 mg by mouth daily         Past History     Past Medical History:  History reviewed. No pertinent past medical history.     Past Surgical History:  Past Surgical History:   Procedure Laterality Date    CHOLECYSTECTOMY      gall stone removed from bile duct    OTHER SURGICAL HISTORY         Family History:  Family History   Problem Relation Age of Onset

## 2024-08-06 ENCOUNTER — HOSPITAL ENCOUNTER (EMERGENCY)
Facility: HOSPITAL | Age: 44
Discharge: HOME | End: 2024-08-06
Attending: STUDENT IN AN ORGANIZED HEALTH CARE EDUCATION/TRAINING PROGRAM
Payer: COMMERCIAL

## 2024-08-06 ENCOUNTER — APPOINTMENT (OUTPATIENT)
Facility: HOSPITAL | Age: 44
End: 2024-08-06
Payer: COMMERCIAL

## 2024-08-06 VITALS
SYSTOLIC BLOOD PRESSURE: 138 MMHG | RESPIRATION RATE: 18 BRPM | OXYGEN SATURATION: 99 % | WEIGHT: 180 LBS | HEART RATE: 94 BPM | HEIGHT: 64 IN | DIASTOLIC BLOOD PRESSURE: 96 MMHG | BODY MASS INDEX: 30.73 KG/M2 | TEMPERATURE: 98.1 F

## 2024-08-06 DIAGNOSIS — R10.13 ABDOMINAL PAIN, EPIGASTRIC: Primary | ICD-10-CM

## 2024-08-06 LAB
ALBUMIN SERPL-MCNC: 3.9 G/DL (ref 3.4–5)
ALBUMIN/GLOB SERPL: 1.1 (ref 0.8–1.7)
ALP SERPL-CCNC: 93 U/L (ref 45–117)
ALT SERPL-CCNC: 44 U/L (ref 13–56)
ANION GAP SERPL CALC-SCNC: 7 MMOL/L (ref 3–18)
APPEARANCE UR: CLEAR
AST SERPL-CCNC: 90 U/L (ref 10–38)
BACTERIA URNS QL MICRO: ABNORMAL /HPF
BASOPHILS # BLD: 0 K/UL (ref 0–0.1)
BASOPHILS NFR BLD: 0 % (ref 0–2)
BILIRUB SERPL-MCNC: 0.5 MG/DL (ref 0.2–1)
BILIRUB UR QL: NEGATIVE
BUN SERPL-MCNC: 9 MG/DL (ref 7–18)
BUN/CREAT SERPL: 12 (ref 12–20)
CALCIUM SERPL-MCNC: 9.9 MG/DL (ref 8.5–10.1)
CHLORIDE SERPL-SCNC: 107 MMOL/L (ref 100–111)
CO2 SERPL-SCNC: 25 MMOL/L (ref 21–32)
COLOR UR: ABNORMAL
CREAT SERPL-MCNC: 0.75 MG/DL (ref 0.6–1.3)
DIFFERENTIAL METHOD BLD: ABNORMAL
EKG ATRIAL RATE: 86 BPM
EKG DIAGNOSIS: NORMAL
EKG P AXIS: 55 DEGREES
EKG P-R INTERVAL: 166 MS
EKG Q-T INTERVAL: 368 MS
EKG QRS DURATION: 82 MS
EKG QTC CALCULATION (BAZETT): 440 MS
EKG R AXIS: 34 DEGREES
EKG T AXIS: 20 DEGREES
EKG VENTRICULAR RATE: 86 BPM
EOSINOPHIL # BLD: 0.1 K/UL (ref 0–0.4)
EOSINOPHIL NFR BLD: 1 % (ref 0–5)
EPITH CASTS URNS QL MICRO: ABNORMAL /LPF (ref 0–5)
ERYTHROCYTE [DISTWIDTH] IN BLOOD BY AUTOMATED COUNT: 13.9 % (ref 11.6–14.5)
GLOBULIN SER CALC-MCNC: 3.5 G/DL (ref 2–4)
GLUCOSE SERPL-MCNC: 106 MG/DL (ref 74–99)
GLUCOSE UR STRIP.AUTO-MCNC: NEGATIVE MG/DL
HCG SERPL QL: NEGATIVE
HCT VFR BLD AUTO: 41 % (ref 35–45)
HGB BLD-MCNC: 13.1 G/DL (ref 12–16)
HGB UR QL STRIP: ABNORMAL
IMM GRANULOCYTES # BLD AUTO: 0.1 K/UL (ref 0–0.04)
IMM GRANULOCYTES NFR BLD AUTO: 0 % (ref 0–0.5)
KETONES UR QL STRIP.AUTO: 15 MG/DL
LEUKOCYTE ESTERASE UR QL STRIP.AUTO: NEGATIVE
LIPASE SERPL-CCNC: 22 U/L (ref 13–75)
LYMPHOCYTES # BLD: 3.6 K/UL (ref 0.9–3.6)
LYMPHOCYTES NFR BLD: 32 % (ref 21–52)
MCH RBC QN AUTO: 25.9 PG (ref 24–34)
MCHC RBC AUTO-ENTMCNC: 32 G/DL (ref 31–37)
MCV RBC AUTO: 81 FL (ref 78–100)
MONOCYTES # BLD: 0.9 K/UL (ref 0.05–1.2)
MONOCYTES NFR BLD: 7 % (ref 3–10)
MUCOUS THREADS URNS QL MICRO: ABNORMAL /LPF
NEUTS SEG # BLD: 6.9 K/UL (ref 1.8–8)
NEUTS SEG NFR BLD: 60 % (ref 40–73)
NITRITE UR QL STRIP.AUTO: NEGATIVE
NRBC # BLD: 0 K/UL (ref 0–0.01)
NRBC BLD-RTO: 0 PER 100 WBC
PH UR STRIP: 5.5 (ref 5–8)
PLATELET # BLD AUTO: 379 K/UL (ref 135–420)
PMV BLD AUTO: 8.8 FL (ref 9.2–11.8)
POTASSIUM SERPL-SCNC: 4 MMOL/L (ref 3.5–5.5)
PROT SERPL-MCNC: 7.4 G/DL (ref 6.4–8.2)
PROT UR STRIP-MCNC: 30 MG/DL
RBC # BLD AUTO: 5.06 M/UL (ref 4.2–5.3)
RBC #/AREA URNS HPF: ABNORMAL /HPF (ref 0–5)
SODIUM SERPL-SCNC: 139 MMOL/L (ref 136–145)
SP GR UR REFRACTOMETRY: 1.03 (ref 1–1.03)
TROPONIN I SERPL HS-MCNC: 6 NG/L (ref 0–54)
UROBILINOGEN UR QL STRIP.AUTO: 2 EU/DL (ref 0.2–1)
WBC # BLD AUTO: 11.5 K/UL (ref 4.6–13.2)
WBC URNS QL MICRO: NEGATIVE /HPF (ref 0–4)

## 2024-08-06 PROCEDURE — 83690 ASSAY OF LIPASE: CPT

## 2024-08-06 PROCEDURE — 84484 ASSAY OF TROPONIN QUANT: CPT

## 2024-08-06 PROCEDURE — 93005 ELECTROCARDIOGRAM TRACING: CPT | Performed by: STUDENT IN AN ORGANIZED HEALTH CARE EDUCATION/TRAINING PROGRAM

## 2024-08-06 PROCEDURE — 2580000003 HC RX 258: Performed by: STUDENT IN AN ORGANIZED HEALTH CARE EDUCATION/TRAINING PROGRAM

## 2024-08-06 PROCEDURE — 85025 COMPLETE CBC W/AUTO DIFF WBC: CPT

## 2024-08-06 PROCEDURE — 6360000002 HC RX W HCPCS: Performed by: STUDENT IN AN ORGANIZED HEALTH CARE EDUCATION/TRAINING PROGRAM

## 2024-08-06 PROCEDURE — 81001 URINALYSIS AUTO W/SCOPE: CPT

## 2024-08-06 PROCEDURE — 74177 CT ABD & PELVIS W/CONTRAST: CPT

## 2024-08-06 PROCEDURE — 80053 COMPREHEN METABOLIC PANEL: CPT

## 2024-08-06 PROCEDURE — 96374 THER/PROPH/DIAG INJ IV PUSH: CPT

## 2024-08-06 PROCEDURE — 99285 EMERGENCY DEPT VISIT HI MDM: CPT

## 2024-08-06 PROCEDURE — 93010 ELECTROCARDIOGRAM REPORT: CPT | Performed by: INTERNAL MEDICINE

## 2024-08-06 PROCEDURE — 84703 CHORIONIC GONADOTROPIN ASSAY: CPT

## 2024-08-06 PROCEDURE — 6360000004 HC RX CONTRAST MEDICATION: Performed by: STUDENT IN AN ORGANIZED HEALTH CARE EDUCATION/TRAINING PROGRAM

## 2024-08-06 RX ORDER — ONDANSETRON 4 MG/1
4 TABLET, FILM COATED ORAL 3 TIMES DAILY PRN
Qty: 15 TABLET | Refills: 0 | Status: SHIPPED | OUTPATIENT
Start: 2024-08-06

## 2024-08-06 RX ORDER — SODIUM CHLORIDE, SODIUM LACTATE, POTASSIUM CHLORIDE, AND CALCIUM CHLORIDE .6; .31; .03; .02 G/100ML; G/100ML; G/100ML; G/100ML
1000 INJECTION, SOLUTION INTRAVENOUS ONCE
Status: COMPLETED | OUTPATIENT
Start: 2024-08-06 | End: 2024-08-06

## 2024-08-06 RX ORDER — ONDANSETRON 2 MG/ML
4 INJECTION INTRAMUSCULAR; INTRAVENOUS
Status: COMPLETED | OUTPATIENT
Start: 2024-08-06 | End: 2024-08-06

## 2024-08-06 RX ADMIN — IOPAMIDOL 100 ML: 612 INJECTION, SOLUTION INTRAVENOUS at 02:29

## 2024-08-06 RX ADMIN — ONDANSETRON 4 MG: 2 INJECTION INTRAMUSCULAR; INTRAVENOUS at 02:16

## 2024-08-06 RX ADMIN — SODIUM CHLORIDE, POTASSIUM CHLORIDE, SODIUM LACTATE AND CALCIUM CHLORIDE 1000 ML: 600; 310; 30; 20 INJECTION, SOLUTION INTRAVENOUS at 02:14

## 2024-08-06 NOTE — ED TRIAGE NOTES
Received patient in triage with c/o abdominal pain and chest pain that has been intermittent x 2 days. Pt reports hx of gallbladder removal.

## 2024-08-06 NOTE — DISCHARGE INSTRUCTIONS
You were evaluated for abdominal pain .  Based on your work-up it was deemed that she was stable for discharge.  Please  your medication of zofran which was prescribed to you.  Please follow-up with your primary care physician if you have any further concerns and go over your work-up.  If you experience any chest pain, shortness of breath, worsening abdominal pain, vomiting blood, worsening headache, seizures, or any worsening of your symptoms please return to the emergency department immediately.  If you have any pending results or any further questions please contact the emergency department at [phone].

## 2024-08-06 NOTE — ED PROVIDER NOTES
EMERGENCY DEPARTMENT HISTORY AND PHYSICAL EXAM    3:19 AM      Date: 8/6/2024  Patient Name: Dora Butler    History of Presenting Illness     Chief Complaint   Patient presents with    Abdominal Pain    Chest Pain       History From: Patient    Dora Butler is a 43 y.o. female   HPI  43-year-old female with history of cholecystectomy, SBO presents with abdominal pain.  Patient to the symptoms started today.  Located in the epigastric region, mild to moderate, intermittent, throbbing.  No aggravating or alleviating factors.  Denies any change meds, sick contacts, change in diet, or any other changes.  At this time patient denies any chest pain.  When assessing ROS she endorses nausea, vomiting, however no change in bowel movement or change urination or any other changes.      Nursing Notes were all reviewed and agreed with or any disagreements were addressed in the HPI.    PCP: Johnathan Katz MD    No current facility-administered medications for this encounter.     Current Outpatient Medications   Medication Sig Dispense Refill    ondansetron (ZOFRAN) 4 MG tablet Take 1 tablet by mouth 3 times daily as needed for Nausea or Vomiting 15 tablet 0    acetaminophen (TYLENOL) 325 MG tablet Take by mouth every 4 hours as needed      Cholecalciferol 50 MCG (2000 UT) CAPS Take 2,000 Units by mouth daily      Cholecalciferol 1.25 MG (95140 UT) TABS Take 50,000 Units by mouth every 7 days      etonogestrel (NEXPLANON) 68 MG implant Inject into the skin      omeprazole (PRILOSEC OTC) 20 MG tablet Take 20 mg by mouth daily         Past History     Past Medical History:  No past medical history on file.    Past Surgical History:  Past Surgical History:   Procedure Laterality Date    CHOLECYSTECTOMY      gall stone removed from bile duct    OTHER SURGICAL HISTORY         Family History:  Family History   Problem Relation Age of Onset    Heart Disease Father         heart attack        Social History:  Social History     Tobacco

## 2024-08-22 ENCOUNTER — HOSPITAL ENCOUNTER (EMERGENCY)
Facility: HOSPITAL | Age: 44
Discharge: ANOTHER HOSPITAL | End: 2024-08-23
Attending: EMERGENCY MEDICINE
Payer: COMMERCIAL

## 2024-08-22 ENCOUNTER — APPOINTMENT (OUTPATIENT)
Facility: HOSPITAL | Age: 44
End: 2024-08-22
Payer: COMMERCIAL

## 2024-08-22 DIAGNOSIS — K80.50 CHOLEDOCHOLITHIASIS: ICD-10-CM

## 2024-08-22 DIAGNOSIS — R11.2 NAUSEA AND VOMITING, UNSPECIFIED VOMITING TYPE: ICD-10-CM

## 2024-08-22 DIAGNOSIS — R10.13 ABDOMINAL PAIN, EPIGASTRIC: Primary | ICD-10-CM

## 2024-08-22 DIAGNOSIS — R74.01 TRANSAMINITIS: ICD-10-CM

## 2024-08-22 LAB
ALBUMIN SERPL-MCNC: 3.9 G/DL (ref 3.4–5)
ALBUMIN/GLOB SERPL: 1 (ref 0.8–1.7)
ALP SERPL-CCNC: 200 U/L (ref 45–117)
ALT SERPL-CCNC: 209 U/L (ref 13–56)
AMPHET UR QL SCN: NEGATIVE
ANION GAP SERPL CALC-SCNC: 8 MMOL/L (ref 3–18)
APPEARANCE UR: ABNORMAL
AST SERPL-CCNC: 377 U/L (ref 10–38)
BACTERIA URNS QL MICRO: ABNORMAL /HPF
BARBITURATES UR QL SCN: NEGATIVE
BASOPHILS # BLD: 0 K/UL (ref 0–0.1)
BASOPHILS NFR BLD: 0 % (ref 0–2)
BENZODIAZ UR QL: NEGATIVE
BILIRUB SERPL-MCNC: 0.6 MG/DL (ref 0.2–1)
BILIRUB UR QL: NEGATIVE
BUN SERPL-MCNC: 9 MG/DL (ref 7–18)
BUN/CREAT SERPL: 10 (ref 12–20)
CALCIUM SERPL-MCNC: 9.6 MG/DL (ref 8.5–10.1)
CANNABINOIDS UR QL SCN: NEGATIVE
CHLORIDE SERPL-SCNC: 106 MMOL/L (ref 100–111)
CO2 SERPL-SCNC: 24 MMOL/L (ref 21–32)
COCAINE UR QL SCN: NEGATIVE
COLOR UR: YELLOW
CREAT SERPL-MCNC: 0.91 MG/DL (ref 0.6–1.3)
DIFFERENTIAL METHOD BLD: ABNORMAL
EOSINOPHIL # BLD: 0 K/UL (ref 0–0.4)
EOSINOPHIL NFR BLD: 0 % (ref 0–5)
EPITH CASTS URNS QL MICRO: ABNORMAL /LPF (ref 0–5)
ERYTHROCYTE [DISTWIDTH] IN BLOOD BY AUTOMATED COUNT: 13.7 % (ref 11.6–14.5)
GLOBULIN SER CALC-MCNC: 3.9 G/DL (ref 2–4)
GLUCOSE SERPL-MCNC: 130 MG/DL (ref 74–99)
GLUCOSE UR STRIP.AUTO-MCNC: NEGATIVE MG/DL
HCG SERPL-ACNC: <1 MIU/ML (ref 0–10)
HCT VFR BLD AUTO: 43.2 % (ref 35–45)
HGB BLD-MCNC: 13.3 G/DL (ref 12–16)
HGB UR QL STRIP: NEGATIVE
IMM GRANULOCYTES # BLD AUTO: 0.1 K/UL (ref 0–0.04)
IMM GRANULOCYTES NFR BLD AUTO: 1 % (ref 0–0.5)
KETONES UR QL STRIP.AUTO: NEGATIVE MG/DL
LEUKOCYTE ESTERASE UR QL STRIP.AUTO: NEGATIVE
LIPASE SERPL-CCNC: 58 U/L (ref 13–75)
LYMPHOCYTES # BLD: 2.9 K/UL (ref 0.9–3.6)
LYMPHOCYTES NFR BLD: 27 % (ref 21–52)
Lab: NORMAL
MCH RBC QN AUTO: 25.2 PG (ref 24–34)
MCHC RBC AUTO-ENTMCNC: 30.8 G/DL (ref 31–37)
MCV RBC AUTO: 81.8 FL (ref 78–100)
METHADONE UR QL: NEGATIVE
MONOCYTES # BLD: 0.6 K/UL (ref 0.05–1.2)
MONOCYTES NFR BLD: 6 % (ref 3–10)
NEUTS SEG # BLD: 7.1 K/UL (ref 1.8–8)
NEUTS SEG NFR BLD: 66 % (ref 40–73)
NITRITE UR QL STRIP.AUTO: NEGATIVE
NRBC # BLD: 0 K/UL (ref 0–0.01)
NRBC BLD-RTO: 0 PER 100 WBC
OPIATES UR QL: NEGATIVE
PCP UR QL: NEGATIVE
PH UR STRIP: 7 (ref 5–8)
PLATELET # BLD AUTO: 550 K/UL (ref 135–420)
PMV BLD AUTO: 8.7 FL (ref 9.2–11.8)
POTASSIUM SERPL-SCNC: 4.1 MMOL/L (ref 3.5–5.5)
PROT SERPL-MCNC: 7.8 G/DL (ref 6.4–8.2)
PROT UR STRIP-MCNC: ABNORMAL MG/DL
RBC # BLD AUTO: 5.28 M/UL (ref 4.2–5.3)
RBC #/AREA URNS HPF: ABNORMAL /HPF (ref 0–5)
SODIUM SERPL-SCNC: 138 MMOL/L (ref 136–145)
SP GR UR REFRACTOMETRY: 1.02 (ref 1–1.03)
UROBILINOGEN UR QL STRIP.AUTO: 2 EU/DL (ref 0.2–1)
WBC # BLD AUTO: 10.8 K/UL (ref 4.6–13.2)
WBC URNS QL MICRO: ABNORMAL /HPF (ref 0–4)

## 2024-08-22 PROCEDURE — 80053 COMPREHEN METABOLIC PANEL: CPT

## 2024-08-22 PROCEDURE — 6360000002 HC RX W HCPCS: Performed by: EMERGENCY MEDICINE

## 2024-08-22 PROCEDURE — 84702 CHORIONIC GONADOTROPIN TEST: CPT

## 2024-08-22 PROCEDURE — 96361 HYDRATE IV INFUSION ADD-ON: CPT

## 2024-08-22 PROCEDURE — 96375 TX/PRO/DX INJ NEW DRUG ADDON: CPT

## 2024-08-22 PROCEDURE — 2580000003 HC RX 258: Performed by: EMERGENCY MEDICINE

## 2024-08-22 PROCEDURE — 81001 URINALYSIS AUTO W/SCOPE: CPT

## 2024-08-22 PROCEDURE — 93005 ELECTROCARDIOGRAM TRACING: CPT | Performed by: EMERGENCY MEDICINE

## 2024-08-22 PROCEDURE — 85025 COMPLETE CBC W/AUTO DIFF WBC: CPT

## 2024-08-22 PROCEDURE — 74177 CT ABD & PELVIS W/CONTRAST: CPT

## 2024-08-22 PROCEDURE — 99285 EMERGENCY DEPT VISIT HI MDM: CPT

## 2024-08-22 PROCEDURE — 80307 DRUG TEST PRSMV CHEM ANLYZR: CPT

## 2024-08-22 PROCEDURE — 96374 THER/PROPH/DIAG INJ IV PUSH: CPT

## 2024-08-22 PROCEDURE — 6360000004 HC RX CONTRAST MEDICATION: Performed by: EMERGENCY MEDICINE

## 2024-08-22 PROCEDURE — 83690 ASSAY OF LIPASE: CPT

## 2024-08-22 PROCEDURE — 2500000003 HC RX 250 WO HCPCS: Performed by: EMERGENCY MEDICINE

## 2024-08-22 RX ORDER — MORPHINE SULFATE 4 MG/ML
4 INJECTION, SOLUTION INTRAMUSCULAR; INTRAVENOUS
Status: COMPLETED | OUTPATIENT
Start: 2024-08-22 | End: 2024-08-22

## 2024-08-22 RX ORDER — MAGNESIUM HYDROXIDE/ALUMINUM HYDROXICE/SIMETHICONE 120; 1200; 1200 MG/30ML; MG/30ML; MG/30ML
30 SUSPENSION ORAL
Status: DISPENSED | OUTPATIENT
Start: 2024-08-22 | End: 2024-08-23

## 2024-08-22 RX ORDER — KETOROLAC TROMETHAMINE 15 MG/ML
30 INJECTION, SOLUTION INTRAMUSCULAR; INTRAVENOUS
Status: COMPLETED | OUTPATIENT
Start: 2024-08-22 | End: 2024-08-22

## 2024-08-22 RX ORDER — IOPAMIDOL 612 MG/ML
100 INJECTION, SOLUTION INTRAVASCULAR
Status: COMPLETED | OUTPATIENT
Start: 2024-08-22 | End: 2024-08-22

## 2024-08-22 RX ORDER — 0.9 % SODIUM CHLORIDE 0.9 %
1000 INTRAVENOUS SOLUTION INTRAVENOUS ONCE
Status: COMPLETED | OUTPATIENT
Start: 2024-08-22 | End: 2024-08-22

## 2024-08-22 RX ORDER — ONDANSETRON 2 MG/ML
4 INJECTION INTRAMUSCULAR; INTRAVENOUS
Status: COMPLETED | OUTPATIENT
Start: 2024-08-22 | End: 2024-08-22

## 2024-08-22 RX ADMIN — SODIUM CHLORIDE 1000 ML: 9 INJECTION, SOLUTION INTRAVENOUS at 20:43

## 2024-08-22 RX ADMIN — IOPAMIDOL 80 ML: 612 INJECTION, SOLUTION INTRAVENOUS at 22:58

## 2024-08-22 RX ADMIN — MORPHINE SULFATE 4 MG: 4 INJECTION, SOLUTION INTRAMUSCULAR; INTRAVENOUS at 22:05

## 2024-08-22 RX ADMIN — ONDANSETRON 4 MG: 2 INJECTION INTRAMUSCULAR; INTRAVENOUS at 20:43

## 2024-08-22 RX ADMIN — FAMOTIDINE 20 MG: 10 INJECTION, SOLUTION INTRAVENOUS at 20:43

## 2024-08-22 RX ADMIN — KETOROLAC TROMETHAMINE 30 MG: 15 INJECTION, SOLUTION INTRAMUSCULAR; INTRAVENOUS at 21:05

## 2024-08-22 ASSESSMENT — PAIN DESCRIPTION - ORIENTATION
ORIENTATION: UPPER

## 2024-08-22 ASSESSMENT — PAIN SCALES - GENERAL
PAINLEVEL_OUTOF10: 10
PAINLEVEL_OUTOF10: 10
PAINLEVEL_OUTOF10: 9

## 2024-08-22 ASSESSMENT — ENCOUNTER SYMPTOMS
NAUSEA: 1
COUGH: 0
SHORTNESS OF BREATH: 0
ABDOMINAL PAIN: 1
VOMITING: 1

## 2024-08-22 ASSESSMENT — PAIN DESCRIPTION - LOCATION
LOCATION: ABDOMEN

## 2024-08-22 ASSESSMENT — PAIN - FUNCTIONAL ASSESSMENT: PAIN_FUNCTIONAL_ASSESSMENT: 0-10

## 2024-08-23 ENCOUNTER — APPOINTMENT (OUTPATIENT)
Facility: HOSPITAL | Age: 44
End: 2024-08-23
Payer: COMMERCIAL

## 2024-08-23 VITALS
WEIGHT: 188 LBS | HEIGHT: 64 IN | BODY MASS INDEX: 32.1 KG/M2 | DIASTOLIC BLOOD PRESSURE: 72 MMHG | OXYGEN SATURATION: 97 % | SYSTOLIC BLOOD PRESSURE: 112 MMHG | RESPIRATION RATE: 16 BRPM | TEMPERATURE: 98.2 F | HEART RATE: 90 BPM

## 2024-08-23 LAB
ALBUMIN SERPL-MCNC: 3.3 G/DL (ref 3.4–5)
ALBUMIN/GLOB SERPL: 0.8 (ref 0.8–1.7)
ALP SERPL-CCNC: 208 U/L (ref 45–117)
ALT SERPL-CCNC: 248 U/L (ref 13–56)
ANION GAP SERPL CALC-SCNC: 8 MMOL/L (ref 3–18)
AST SERPL-CCNC: 167 U/L (ref 10–38)
BILIRUB SERPL-MCNC: 3 MG/DL (ref 0.2–1)
BUN SERPL-MCNC: 7 MG/DL (ref 7–18)
BUN/CREAT SERPL: 11 (ref 12–20)
CALCIUM SERPL-MCNC: 9.6 MG/DL (ref 8.5–10.1)
CHLORIDE SERPL-SCNC: 104 MMOL/L (ref 100–111)
CO2 SERPL-SCNC: 23 MMOL/L (ref 21–32)
CREAT SERPL-MCNC: 0.65 MG/DL (ref 0.6–1.3)
EKG ATRIAL RATE: 82 BPM
EKG DIAGNOSIS: NORMAL
EKG P AXIS: 65 DEGREES
EKG P-R INTERVAL: 164 MS
EKG Q-T INTERVAL: 366 MS
EKG QRS DURATION: 80 MS
EKG QTC CALCULATION (BAZETT): 427 MS
EKG R AXIS: 58 DEGREES
EKG T AXIS: 38 DEGREES
EKG VENTRICULAR RATE: 82 BPM
GLOBULIN SER CALC-MCNC: 4.1 G/DL (ref 2–4)
GLUCOSE SERPL-MCNC: 87 MG/DL (ref 74–99)
LIPASE SERPL-CCNC: 15 U/L (ref 13–75)
POTASSIUM SERPL-SCNC: 3.5 MMOL/L (ref 3.5–5.5)
PROT SERPL-MCNC: 7.4 G/DL (ref 6.4–8.2)
SODIUM SERPL-SCNC: 135 MMOL/L (ref 136–145)

## 2024-08-23 PROCEDURE — 6370000000 HC RX 637 (ALT 250 FOR IP): Performed by: STUDENT IN AN ORGANIZED HEALTH CARE EDUCATION/TRAINING PROGRAM

## 2024-08-23 PROCEDURE — 6360000002 HC RX W HCPCS: Performed by: STUDENT IN AN ORGANIZED HEALTH CARE EDUCATION/TRAINING PROGRAM

## 2024-08-23 PROCEDURE — 83690 ASSAY OF LIPASE: CPT

## 2024-08-23 PROCEDURE — 93010 ELECTROCARDIOGRAM REPORT: CPT | Performed by: INTERNAL MEDICINE

## 2024-08-23 PROCEDURE — 6360000004 HC RX CONTRAST MEDICATION: Performed by: EMERGENCY MEDICINE

## 2024-08-23 PROCEDURE — 6360000002 HC RX W HCPCS: Performed by: EMERGENCY MEDICINE

## 2024-08-23 PROCEDURE — 74183 MRI ABD W/O CNTR FLWD CNTR: CPT

## 2024-08-23 PROCEDURE — A9577 INJ MULTIHANCE: HCPCS | Performed by: EMERGENCY MEDICINE

## 2024-08-23 PROCEDURE — 96376 TX/PRO/DX INJ SAME DRUG ADON: CPT

## 2024-08-23 PROCEDURE — 94761 N-INVAS EAR/PLS OXIMETRY MLT: CPT

## 2024-08-23 PROCEDURE — 80053 COMPREHEN METABOLIC PANEL: CPT

## 2024-08-23 RX ORDER — ONDANSETRON 2 MG/ML
4 INJECTION INTRAMUSCULAR; INTRAVENOUS ONCE
Status: COMPLETED | OUTPATIENT
Start: 2024-08-23 | End: 2024-08-23

## 2024-08-23 RX ORDER — PANTOPRAZOLE SODIUM 40 MG/1
40 TABLET, DELAYED RELEASE ORAL ONCE
Status: COMPLETED | OUTPATIENT
Start: 2024-08-23 | End: 2024-08-23

## 2024-08-23 RX ORDER — CETIRIZINE HYDROCHLORIDE 10 MG/1
10 TABLET ORAL
Status: COMPLETED | OUTPATIENT
Start: 2024-08-23 | End: 2024-08-23

## 2024-08-23 RX ORDER — MORPHINE SULFATE 4 MG/ML
4 INJECTION, SOLUTION INTRAMUSCULAR; INTRAVENOUS
Status: COMPLETED | OUTPATIENT
Start: 2024-08-23 | End: 2024-08-23

## 2024-08-23 RX ADMIN — ONDANSETRON 4 MG: 2 INJECTION INTRAMUSCULAR; INTRAVENOUS at 05:43

## 2024-08-23 RX ADMIN — CETIRIZINE HYDROCHLORIDE 10 MG: 10 TABLET, FILM COATED ORAL at 21:56

## 2024-08-23 RX ADMIN — PANTOPRAZOLE SODIUM 40 MG: 40 TABLET, DELAYED RELEASE ORAL at 21:28

## 2024-08-23 RX ADMIN — MORPHINE SULFATE 4 MG: 4 INJECTION, SOLUTION INTRAMUSCULAR; INTRAVENOUS at 21:47

## 2024-08-23 RX ADMIN — GADOBENATE DIMEGLUMINE 17 ML: 529 INJECTION, SOLUTION INTRAVENOUS at 14:02

## 2024-08-23 RX ADMIN — MORPHINE SULFATE 4 MG: 4 INJECTION, SOLUTION INTRAMUSCULAR; INTRAVENOUS at 05:43

## 2024-08-23 ASSESSMENT — PAIN SCALES - GENERAL
PAINLEVEL_OUTOF10: 5
PAINLEVEL_OUTOF10: 0
PAINLEVEL_OUTOF10: 0

## 2024-08-23 ASSESSMENT — PAIN - FUNCTIONAL ASSESSMENT: PAIN_FUNCTIONAL_ASSESSMENT: ACTIVITIES ARE NOT PREVENTED

## 2024-08-23 ASSESSMENT — PAIN DESCRIPTION - LOCATION
LOCATION: ABDOMEN
LOCATION: ABDOMEN

## 2024-08-23 NOTE — ED TRIAGE NOTES
Pt arrived via triage ambulatory c/o upper abdominal pain with vomiting that started around 1630. Pt has been seeing a GI doctor for similar episodes. Recently had an Endoscopy. Last BM: today, normal per pt.

## 2024-08-23 NOTE — ED NOTES
Pt requesting an update on MRI. RN called MRI dept and was advised pt should be able to receive MRI/MRCP at 1300.  Pt updated and verbalized understanding.

## 2024-08-23 NOTE — ED NOTES
9:15 pm:Pt care assumed from Dr. Tinoco , ED provider. Pt complaint(s), current treatment plan, progression and available diagnostic results have been discussed thoroughly. The patient was seen and evaluated on my shift.   Rounding occurred: yes  Intended Disposition: pending  Pending diagnostic reports and/or labs (please list): re-evaluation and PO challenge.     She was noted to have elevated LFT's which are changed compared to prior result  Patient reports that she has been vomiting and experiencing abdominal pain since she came off of a [employer] cruise ship on August 5, 2024  She is underwent a prior cholecystectomy and stated that she developed choledocholithiasis which required multiple surgeries and a stent placement in 2007.  She was seen in this ED for similar complaint on August 6, 2024  Patient evaluated by Dr. Burnett and CT scan of the abdomen and pelvic ordered  CT scan which was performed on August 6 revealed prior cholecystectomy, with mild dilatation of the biliary tree likely reservoir effect, pneumobilia typically seen in this setting secondary to prior ampullectomy, hiatal hernia, gastric sleeve, fibroid uterus, left ovarian cyst.    Her pain and vomiting has been intermittent since August 6, 2024.  Labs Reviewed   CBC WITH AUTO DIFFERENTIAL - Abnormal; Notable for the following components:       Result Value    MCHC 30.8 (*)     Platelets 550 (*)     MPV 8.7 (*)     Immature Granulocytes % 1 (*)     Immature Granulocytes Absolute 0.1 (*)     All other components within normal limits   COMPREHENSIVE METABOLIC PANEL - Abnormal; Notable for the following components:    Glucose 130 (*)     BUN/Creatinine Ratio 10 (*)      (*)      (*)     Alk Phosphatase 200 (*)     All other components within normal limits   URINALYSIS - Abnormal; Notable for the following components:    Protein, UA TRACE (*)     Urobilinogen, Urine 2.0 (*)     All other components within normal limits   URINALYSIS,  MICRO - Abnormal; Notable for the following components:    BACTERIA, URINE 4+ (*)     All other components within normal limits   LIPASE   HCG, QUANTITATIVE, PREGNANCY   URINE DRUG SCREEN   COMPREHENSIVE METABOLIC PANEL W/ REFLEX TO MG FOR LOW K   POC BLOOD GAS     CT ABDOMEN PELVIS W IV CONTRAST Additional Contrast? None   Final Result   Increased biliary dilatation compared to prior comparison. Small filling defect   in the distal CBD is potentially suspicious for choledocholithiasis. MRCP could   be confirmatory.      Additional findings as above.      Thank you for enabling us to participate in the care of this patient.      Electronically signed by Black Hewitt      MRI ABDOMEN W WO CONTRAST MRCP    (Results Pending)     12:35 AM -CT scan results reveal small filling defect in the distal CBD which is potentially suspicious for choledocholithiasis.    MRCP ordered    4:31 AM : Pt care transferred to Dr. Patterson  ,ED provider. History of patient complaint(s), available diagnostic reports and current treatment plan has been discussed thoroughly.   Bedside rounding on patient occured : yes .  Intended disposition of patient : Pending  Pending diagnostics reports and/or labs (please list): MRCP      Diagnosis:   1. Abdominal pain, epigastric    2. Nausea and vomiting, unspecified vomiting type          Disposition:    DISPOSITION    Condition at Disposition: Data Unavailable    Home or Self Care     [unfilled]             Elier Burnett DO  08/24/24 0670

## 2024-08-23 NOTE — ED PROVIDER NOTES
EMERGENCY DEPARTMENT HISTORY AND PHYSICAL EXAM    8:20 PM      Date: 8/22/2024  Patient Name: Dora Butler    History of Presenting Illness     Chief Complaint   Patient presents with    Abdominal Pain       History From: Patient    Dora Butler is a 43 y.o. female   43-year-old female past medical history of SBO and cholecystectomy recurrent abdominal pain presents to the ED for constant abdominal pain.  States that the belly pain started about 4:30 PM nonradiating and associated with multiple episodes of vomiting.  Patient has been seen in the ED multiple times for abdominal pain complaints    Has been passing gas and pooping normally.    Denies urinary changes  Denies fever  Denies chest pain, shortness of breath or cough    Last menstrual period August 6.           Nursing Notes were all reviewed and agreed with or any disagreements were addressed in the HPI.    PCP: Johnathan Katz MD    No current facility-administered medications for this encounter.     Current Outpatient Medications   Medication Sig Dispense Refill    ondansetron (ZOFRAN) 4 MG tablet Take 1 tablet by mouth 3 times daily as needed for Nausea or Vomiting 15 tablet 0    acetaminophen (TYLENOL) 325 MG tablet Take by mouth every 4 hours as needed      Cholecalciferol 50 MCG (2000 UT) CAPS Take 2,000 Units by mouth daily      Cholecalciferol 1.25 MG (27962 UT) TABS Take 50,000 Units by mouth every 7 days      etonogestrel (NEXPLANON) 68 MG implant Inject into the skin      omeprazole (PRILOSEC OTC) 20 MG tablet Take 20 mg by mouth daily         Past History     Past Medical History:  No past medical history on file.    Past Surgical History:  Past Surgical History:   Procedure Laterality Date    CHOLECYSTECTOMY      gall stone removed from bile duct    OTHER SURGICAL HISTORY         Family History:  Family History   Problem Relation Age of Onset    Heart Disease Father         heart attack        Social History:  Social History     Tobacco Use 
mrcp shows choledocholithiasis / pending transfer for ercp [KS]      ED Course User Index  [DM] Susan Cárdenas MD  [KS] Richard Appiah MD       Patient was given the following medications:  Medications   aluminum & magnesium hydroxide-simethicone (MAALOX) 200-200-20 MG/5ML suspension 30 mL (30 mLs Oral Not Given 8/22/24 2044)   ondansetron (ZOFRAN) injection 4 mg (4 mg IntraVENous Given 8/22/24 2043)   famotidine (PEPCID) 20 mg in sodium chloride (PF) 0.9 % 10 mL injection (20 mg IntraVENous Given 8/22/24 2043)   sodium chloride 0.9 % bolus 1,000 mL (0 mLs IntraVENous Stopped 8/22/24 2327)   ketorolac (TORADOL) injection 30 mg (30 mg IntraVENous Given 8/22/24 2105)   iopamidol (ISOVUE-300) 61 % injection 100 mL (80 mLs IntraVENous Given 8/22/24 2258)   morphine sulfate (PF) injection 4 mg (4 mg IntraVENous Given 8/22/24 2205)   ondansetron (ZOFRAN) injection 4 mg (4 mg IntraVENous Given 8/23/24 0543)   morphine sulfate (PF) injection 4 mg (4 mg IntraVENous Given 8/23/24 0543)   gadobenate dimeglumine (MULTIHANCE) injection 17 mL (17 mLs IntraVENous Given 8/23/24 1402)       CONSULTS: (Who and What was discussed)  None    Chronic Conditions: prior cholecystectomy 2007    Social Determinants affecting Dx or Tx: None    Records Reviewed (source and summary of external notes): Nursing Notes and Previous Laboratory Studies    Procedures    Critical Care Time: 0      Diagnosis     Clinical Impression:   1. Abdominal pain, epigastric    2. Nausea and vomiting, unspecified vomiting type    3. Choledocholithiasis    4. Transaminitis        Disposition: signed out    No follow-up provider specified.     Disclaimer: Sections of this note are dictated using utilizing voice recognition software.  Minor typographical errors may be present. If questions arise, please do not hesitate to contact me or call our department.          Susan Cárdenas MD  08/23/24 [id number]       Susan Cárdenas MD  08/23/24

## 2024-08-24 NOTE — ED NOTES
Patient complaining of itchiness due to iodine contrast. Patient did not tell day shift nurse or MD. Dr. Appiah aware. Zyrtec given.

## 2024-08-24 NOTE — ED NOTES
Patient discharged to home. EMTALA  and discharge papers explained and given to transport. All personal belongings taken with patient from room. Patient escorted to ER exit via [facility] stretcher to waiting ambulance.

## 2024-08-24 NOTE — ED NOTES
Patient given protonix and is eating. Morphine given to decrease pain while en route to [place].